# Patient Record
Sex: MALE | Race: WHITE | NOT HISPANIC OR LATINO | Employment: OTHER | URBAN - METROPOLITAN AREA
[De-identification: names, ages, dates, MRNs, and addresses within clinical notes are randomized per-mention and may not be internally consistent; named-entity substitution may affect disease eponyms.]

---

## 2024-02-10 DIAGNOSIS — I48.92 ATRIAL FIBRILLATION AND FLUTTER: ICD-10-CM

## 2024-02-10 DIAGNOSIS — I48.91 ATRIAL FIBRILLATION AND FLUTTER: ICD-10-CM

## 2024-02-12 RX ORDER — APIXABAN 5 MG/1
5 TABLET, FILM COATED ORAL 2 TIMES DAILY
Qty: 60 TABLET | Refills: 0 | Status: SHIPPED | OUTPATIENT
Start: 2024-02-12

## 2024-02-19 ENCOUNTER — OFFICE VISIT (OUTPATIENT)
Dept: CARDIOLOGY | Facility: CLINIC | Age: 64
End: 2024-02-19
Payer: COMMERCIAL

## 2024-02-19 VITALS
BODY MASS INDEX: 33.96 KG/M2 | DIASTOLIC BLOOD PRESSURE: 84 MMHG | WEIGHT: 264.6 LBS | HEART RATE: 92 BPM | SYSTOLIC BLOOD PRESSURE: 142 MMHG | HEIGHT: 74 IN | OXYGEN SATURATION: 97 %

## 2024-02-19 DIAGNOSIS — G47.33 OSA (OBSTRUCTIVE SLEEP APNEA): ICD-10-CM

## 2024-02-19 DIAGNOSIS — Z79.01 CHRONIC ANTICOAGULATION: ICD-10-CM

## 2024-02-19 DIAGNOSIS — I10 PRIMARY HYPERTENSION: ICD-10-CM

## 2024-02-19 DIAGNOSIS — I48.91 ATRIAL FIBRILLATION AND FLUTTER: Primary | ICD-10-CM

## 2024-02-19 DIAGNOSIS — I48.92 ATRIAL FIBRILLATION AND FLUTTER: Primary | ICD-10-CM

## 2024-02-19 PROCEDURE — 99213 OFFICE O/P EST LOW 20 MIN: CPT | Performed by: PHYSICIAN ASSISTANT

## 2024-02-19 RX ORDER — HYDROCHLOROTHIAZIDE 12.5 MG/1
12.5 CAPSULE, GELATIN COATED ORAL DAILY
Qty: 30 CAPSULE | Refills: 11 | Status: SHIPPED | OUTPATIENT
Start: 2024-02-19

## 2024-02-19 NOTE — PROGRESS NOTES
Encounter Date:02/19/2024      Patient ID: Dave Benitez is a 63 y.o. male.    Bethanie Trujillo MD    Cheif Complaint EP: Atrial Fibrillation and Atrial Flutter    PROBLEM LIST:  Patient Active Problem List    Diagnosis Date Noted    Atrial fibrillation and flutter 10/14/2022     Priority: High     Note Last Updated: 2/6/2023     PVI, 11/17/2022: Normal AV node and His-Purkinje system function. Durable isolation of left-sided pulmonary veins posterior wall as well as right inferior pulmonary from index ablation procedures in [place].  Reisolation of right superior pulmonary vein reisolation of left atrial roof reisolation of left atrial posterior wall. Catheter ablation of SVT #1 from proximal coronary sinus. Catheter ablation of SVT #2 from proximal coronary sinus      Chronic anticoagulation 02/06/2023     Priority: Low    Primary hypertension 10/13/2022     Priority: Low    Coe's esophagus 10/14/2022    ANGELITO (obstructive sleep apnea) 10/14/2022    GERD (gastroesophageal reflux disease) 10/13/2022               History of Present Illness  Patient presents today for follow-up with a history of atrial fibrillation and flutter, PVA, hypertension, ANGELITO.  Patient was recently in [facility] ER due to a systolic BP of 200 where his ACE was switched to an ARB due to coughing side effects.  The patient presents today with a blood pressure of 142/84 which he states is similar to baseline.  He denies palpitations, shortness of breath, lightheadedness.  Patient is compliant with CPAP, anticoagulation, and current medical regimen.       Allergies   Allergen Reactions    Lisinopril Cough       Current Outpatient Medications   Medication Instructions    Eliquis 5 mg, Oral, 2 Times Daily    ibuprofen (ADVIL,MOTRIN) 400 mg, Oral, Every 6 Hours PRN    losartan (COZAAR) 100 mg, Oral, Daily    omeprazole (PRILOSEC) 20 mg, Oral, Daily       .    Objective:     /84 (BP Location: Left arm, Patient Position:  "Sitting)   Pulse 92   Ht 188 cm (74\")   Wt 120 kg (264 lb 9.6 oz)   SpO2 97%   BMI 33.97 kg/m²    Body mass index is 33.97 kg/m².     Constitutional:       Appearance: Well-developed.   Pulmonary:      Effort: Pulmonary effort is normal. No respiratory distress.      Breath sounds: Normal breath sounds. No wheezing. No rales.      Comments: Bases clear  Chest:      Chest wall: Not tender to palpatation.   Cardiovascular:      Normal rate. Regular rhythm.      Murmurs: There is no murmur.      No gallop.  No click. No rub.   Pulses:     Intact distal pulses.   Edema:     Peripheral edema absent.   Musculoskeletal: Normal range of motion.       Lab Review:     Lab Results   Component Value Date    GLUCOSE 118 (H) 02/09/2024    BUN 12 02/09/2024    CREATININE 0.86 02/09/2024    EGFR 97.9 02/09/2024    BCR 14.0 02/09/2024    K 4.1 02/09/2024    CO2 23.0 02/09/2024    CALCIUM 9.2 02/09/2024    ALBUMIN 4.1 02/09/2024    BILITOT 0.3 02/09/2024    AST 21 02/09/2024    ALT 31 02/09/2024     Lab Results   Component Value Date    WBC 6.76 02/09/2024    HGB 12.4 (L) 02/09/2024    HCT 36.9 (L) 02/09/2024    MCV 95.3 02/09/2024     02/09/2024     No results found for: \"TSH\"        Procedures               Assessment:      Diagnosis Plan   1. Atrial fibrillation and flutter  Sinus rhythm.  Recurrent low A-fib/flutter.      2. Primary hypertension  To goal.  Add hydrochlorothiazide 12.5 mg daily.  Titrate to effect.  Continue follow-up with primary cardiology      3. ANGELITO (obstructive sleep apnea)  CPAP compliant      4. Chronic anticoagulation    On Eliquis.  No bleeding        Plan:     Stable cardiac status.  Continue current medications.   in 1 year, sooner as needed.  Thank you for allowing us to participate in the care of your patient.     Electronically signed by YFN Norton, 02/19/24, 11:30 AM SERGIO.      YFN Velázquez-S2  "

## 2024-03-09 DIAGNOSIS — I48.91 ATRIAL FIBRILLATION AND FLUTTER: ICD-10-CM

## 2024-03-09 DIAGNOSIS — I48.92 ATRIAL FIBRILLATION AND FLUTTER: ICD-10-CM

## 2024-03-11 RX ORDER — APIXABAN 5 MG/1
5 TABLET, FILM COATED ORAL 2 TIMES DAILY
Qty: 60 TABLET | Refills: 0 | Status: SHIPPED | OUTPATIENT
Start: 2024-03-11

## 2024-03-25 ENCOUNTER — TELEPHONE (OUTPATIENT)
Dept: CARDIOLOGY | Facility: CLINIC | Age: 64
End: 2024-03-25

## 2024-03-25 NOTE — TELEPHONE ENCOUNTER
Caller: Dave Benitez    Relationship: Self    Best call back number: [phone]     Which medication are you concerned about: LOSARTAN     Who prescribed you this medication: SHANNON WHYTE     When did you start taking this medication: AROUND 02/09/24    What are your concerns: PATIENT STATED THAT AT NIGHT TIME WHEN HE'S SITTING IN HIS CHAIR FOR 1-2 HOURS HE GETS CHILLS AND HIS BLOOD PRESSURE STARTS TO RISE. PATIENT STATED THAT HE DID NOT NOTICE THIS UNTIL HE STARTED TAKING THIS MEDICATION. PATIENT SAID THE OTHER NIGHT WHEN SITTING DOWN HE STARTED HAVING THESE SYMPTOMS AND HE WATCHED HIS BLOOD PRESSURE GO FROM 130/80 /95 AND HE STARTED HAVING CHILLS. PATIENT STATED AFTER GETTING UNDER A BLANKET AND CRACKING UP THE HEATER HIS CHILLS WENT AWAY AND HIS BLOOD PRESSURE CAME DOWN. PATIENT IS CONCERNED THAT HIS MEDICATION DOSE MAY NEED TO BE ADJUSTED.     How long have you had these concerns: 2 WEEKS AFTER HE STARTED THIS MEDICATION.       PATIENT IS NOT CURRENTLY HAVING THESE SYMPTOMS AS THEY COME AND GO

## 2024-03-26 ENCOUNTER — LAB (OUTPATIENT)
Dept: LAB | Facility: HOSPITAL | Age: 64
End: 2024-03-26
Payer: COMMERCIAL

## 2024-03-26 ENCOUNTER — OFFICE VISIT (OUTPATIENT)
Dept: FAMILY MEDICINE CLINIC | Facility: CLINIC | Age: 64
End: 2024-03-26
Payer: COMMERCIAL

## 2024-03-26 VITALS
SYSTOLIC BLOOD PRESSURE: 126 MMHG | OXYGEN SATURATION: 98 % | BODY MASS INDEX: 33.88 KG/M2 | WEIGHT: 264 LBS | HEIGHT: 74 IN | DIASTOLIC BLOOD PRESSURE: 84 MMHG | HEART RATE: 66 BPM

## 2024-03-26 DIAGNOSIS — R73.9 HYPERGLYCEMIA: ICD-10-CM

## 2024-03-26 DIAGNOSIS — Z79.01 CHRONIC ANTICOAGULATION: ICD-10-CM

## 2024-03-26 DIAGNOSIS — Z13.220 SCREENING CHOLESTEROL LEVEL: ICD-10-CM

## 2024-03-26 DIAGNOSIS — R68.83 CHILLS: ICD-10-CM

## 2024-03-26 DIAGNOSIS — R35.0 BENIGN PROSTATIC HYPERPLASIA WITH URINARY FREQUENCY: ICD-10-CM

## 2024-03-26 DIAGNOSIS — I48.92 ATRIAL FIBRILLATION AND FLUTTER: ICD-10-CM

## 2024-03-26 DIAGNOSIS — K22.719 BARRETT'S ESOPHAGUS WITH DYSPLASIA: ICD-10-CM

## 2024-03-26 DIAGNOSIS — R23.8 PAPULES: ICD-10-CM

## 2024-03-26 DIAGNOSIS — I48.91 ATRIAL FIBRILLATION AND FLUTTER: ICD-10-CM

## 2024-03-26 DIAGNOSIS — R68.83 CHILLS: Primary | ICD-10-CM

## 2024-03-26 DIAGNOSIS — K21.00 GASTROESOPHAGEAL REFLUX DISEASE WITH ESOPHAGITIS, UNSPECIFIED WHETHER HEMORRHAGE: ICD-10-CM

## 2024-03-26 DIAGNOSIS — I10 PRIMARY HYPERTENSION: ICD-10-CM

## 2024-03-26 DIAGNOSIS — N40.1 BENIGN PROSTATIC HYPERPLASIA WITH URINARY FREQUENCY: ICD-10-CM

## 2024-03-26 PROBLEM — Z85.038 HISTORY OF COLON CANCER: Status: ACTIVE | Noted: 2024-03-26

## 2024-03-26 LAB
BILIRUB UR QL STRIP: NEGATIVE
CLARITY UR: CLEAR
COLOR UR: YELLOW
GLUCOSE UR STRIP-MCNC: NEGATIVE MG/DL
HBA1C MFR BLD: 6 % (ref 4.8–5.6)
HGB UR QL STRIP.AUTO: NEGATIVE
HOLD SPECIMEN: NORMAL
KETONES UR QL STRIP: NEGATIVE
LEUKOCYTE ESTERASE UR QL STRIP.AUTO: NEGATIVE
NITRITE UR QL STRIP: NEGATIVE
PH UR STRIP.AUTO: 6.5 [PH] (ref 5–8)
PROT UR QL STRIP: NEGATIVE
SP GR UR STRIP: 1.01 (ref 1–1.03)
UROBILINOGEN UR QL STRIP: NORMAL

## 2024-03-26 PROCEDURE — 84443 ASSAY THYROID STIM HORMONE: CPT

## 2024-03-26 PROCEDURE — 87086 URINE CULTURE/COLONY COUNT: CPT

## 2024-03-26 PROCEDURE — 81003 URINALYSIS AUTO W/O SCOPE: CPT

## 2024-03-26 PROCEDURE — 83036 HEMOGLOBIN GLYCOSYLATED A1C: CPT

## 2024-03-26 PROCEDURE — 80053 COMPREHEN METABOLIC PANEL: CPT

## 2024-03-26 RX ORDER — OMEPRAZOLE 20 MG/1
20 CAPSULE, DELAYED RELEASE ORAL DAILY
Qty: 90 CAPSULE | Refills: 2 | Status: SHIPPED | OUTPATIENT
Start: 2024-03-26

## 2024-03-26 RX ORDER — TADALAFIL 5 MG/1
5 TABLET ORAL DAILY
Qty: 30 TABLET | Refills: 3 | Status: SHIPPED | OUTPATIENT
Start: 2024-03-26

## 2024-03-26 NOTE — PROGRESS NOTES
Dave Benitez  1960  [id number]  Patient Care Team:  Vega Ash MD as PCP - General (Internal Medicine)  Shen Recio DO as Consulting Physician (Cardiology)    Dave Benitez is a 64 y.o. male here today to establish care.  This patient is accompanied by their self who contributes to the history of their care.    Chief Complaint:    Chief Complaint   Patient presents with    Blood Sugar Problem     Would like to follow up, has no issues of blood sugar in past but was seen at first care clinic in [facility] and they told me it was because I had breakfast that morning    Hypertension        History of Present Illness:   This gentleman is here to establish.  He is followed by cardiology and electrophysiology.  He has a history of ablative therapy for atrial fibrillation and flutter.  He is on chronic anticoagulation.  He also has primary hypertension for which she takes losartan and hydrochlorothiazide.  He is also maintained on Eliquis for his history of A-fib.  Apparently has history of Coe's esophagus and is maintained on omeprazole 20 mg daily.  Finally he is on CPAP for obstructive sleep apnea.  He maintains compliance with this.    He was told his sugar was elevated at [facility]. Denies polyuria/dipsia. 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     Was seen in er for BP elevation, was changed to cozaar from lisinopril ( cough). Since this time they have added hctz. Bps are much improved. He has developed, in the evenings, he will get bp elvation transiently associated with chills and shivering). He bundles up and feels better. Has happened 3 times)    Recently had a bladder infection 2 mon ago ( was treated took a month to clear up)     He denies cough, congestion, nausea, vomiting or diarrhea, irritative voidng symtoms. Denies rash etc.     His colonoscopies and endoscopies have to be done under general anesthesia secondary to cardiac conditions and severe gag reflex.  "    Past Medical History:   Diagnosis Date    Colon cancer     12 years ago    Colon polyp     HTN (hypertension)        Past Surgical History:   Procedure Laterality Date    ABLATION OF DYSRHYTHMIC FOCUS N/A 11/07/2014    x3    CARDIAC ELECTROPHYSIOLOGY PROCEDURE N/A 11/17/2022    Procedure: Ablation atrial fibrillation +1 hour. Hold flecainide 3 days prior.;  Surgeon: Shen Recio DO;  Location: [facility] INVASIVE LOCATION;  Service: Cardiovascular;  Laterality: N/A;    COLON RESECTION N/A     HERNIA REPAIR N/A     MENISCECTOMY          Family History   Problem Relation Age of Onset    Stroke Mother     Esophageal cancer Father        Social History     Socioeconomic History    Marital status:    Tobacco Use    Smoking status: Never     Passive exposure: Never    Smokeless tobacco: Former     Types: Chew     Quit date: 1982   Vaping Use    Vaping status: Never Used   Substance and Sexual Activity    Alcohol use: Yes     Comment: Rare    Drug use: Never    Sexual activity: Defer       Allergies   Allergen Reactions    Lisinopril Cough       Review of Systems:    Review of Systems   Constitutional:  Positive for chills.   Eyes: Negative.    Cardiovascular: Negative.    Gastrointestinal: Negative.    Endocrine: Positive for polydipsia.   Genitourinary:  Positive for frequency and erectile dysfunction.   Musculoskeletal: Negative.    Skin: Negative.    Neurological: Negative.        Vitals:    03/26/24 1123   BP: 126/84   Pulse: 66   SpO2: 98%   Weight: 120 kg (264 lb)   Height: 188 cm (74\")     Body mass index is 33.9 kg/m².      Current Outpatient Medications:     Eliquis 5 MG tablet tablet, Take 1 tablet by mouth twice daily, Disp: 60 tablet, Rfl: 0    hydroCHLOROthiazide (MICROZIDE) 12.5 MG capsule, Take 1 capsule by mouth Daily., Disp: 30 capsule, Rfl: 11    ibuprofen (ADVIL,MOTRIN) 400 MG tablet, Take 1 tablet by mouth Every 6 (Six) Hours As Needed for Mild Pain., Disp: , Rfl:     losartan (COZAAR) 100 " MG tablet, Take 1 tablet by mouth Daily., Disp: 90 tablet, Rfl: 0    omeprazole (priLOSEC) 20 MG capsule, Take 1 capsule by mouth Daily., Disp: 90 capsule, Rfl: 2    tadalafil (Cialis) 5 MG tablet, Take 1 tablet by mouth Daily., Disp: 30 tablet, Rfl: 3    Physical Exam:    Physical Exam  Vitals and nursing note reviewed.   Constitutional:       General: He is not in acute distress.     Appearance: He is well-developed. He is not diaphoretic.   HENT:      Head: Normocephalic and atraumatic.      Right Ear: External ear normal.      Left Ear: External ear normal.      Mouth/Throat:      Pharynx: No oropharyngeal exudate.   Eyes:      General: No scleral icterus.        Right eye: No discharge.      Conjunctiva/sclera: Conjunctivae normal.   Neck:      Thyroid: No thyromegaly.      Vascular: No JVD.      Trachea: No tracheal deviation.   Cardiovascular:      Rate and Rhythm: Normal rate and regular rhythm.      Heart sounds: Normal heart sounds.      Comments: PMI nondisplaced  Pulmonary:      Effort: Pulmonary effort is normal.      Breath sounds: Normal breath sounds. No wheezing or rales.   Abdominal:      General: Bowel sounds are normal.      Palpations: Abdomen is soft.      Tenderness: There is no abdominal tenderness. There is no guarding or rebound.   Musculoskeletal:      Cervical back: Normal range of motion and neck supple.   Lymphadenopathy:      Cervical: No cervical adenopathy.   Skin:     General: Skin is warm and dry.      Capillary Refill: Capillary refill takes less than 2 seconds.      Coloration: Skin is not pale.      Findings: No rash.      Comments: He has numerous pearly papules on his face.  1 to 3 mm in diameter.   Neurological:      Mental Status: He is alert and oriented to person, place, and time.      Motor: No abnormal muscle tone.      Coordination: Coordination normal.   Psychiatric:         Judgment: Judgment normal.         Procedures    Results Review:    I reviewed the patient's  new clinical results.    Assessment/Plan:    Problem List Items Addressed This Visit       GERD (gastroesophageal reflux disease)    Relevant Medications    omeprazole (priLOSEC) 20 MG capsule    Primary hypertension    Relevant Medications    losartan (COZAAR) 100 MG tablet    hydroCHLOROthiazide (MICROZIDE) 12.5 MG capsule    Coe's esophagus    Current Assessment & Plan     Will be due in about 2 years for repeat colonoscopy as well as EGD.  This will need to be done in the hospital under general anesthesia based on his history.  Continue Prilosec for the foreseeable future.         Relevant Medications    omeprazole (priLOSEC) 20 MG capsule    Atrial fibrillation and flutter    Overview     PVI, 11/17/2022: Normal AV node and His-Purkinje system function. Durable isolation of left-sided pulmonary veins posterior wall as well as right inferior pulmonary from index ablation procedures in [place].  Reisolation of right superior pulmonary vein reisolation of left atrial roof reisolation of left atrial posterior wall. Catheter ablation of SVT #1 from proximal coronary sinus. Catheter ablation of SVT #2 from proximal coronary sinus         Relevant Medications    Eliquis 5 MG tablet tablet    tadalafil (Cialis) 5 MG tablet    Chronic anticoagulation    Papules    Current Assessment & Plan     Derm evaluation recommended to eval for BCC          Other Visit Diagnoses       Chills    -  Primary    Relevant Orders    TSH Rfx On Abnormal To Free T4    Urinalysis With Culture If Indicated -    Urine Culture - Urine, Urine, Clean Catch    Hyperglycemia        Relevant Orders    Comprehensive Metabolic Panel    Hemoglobin A1c    Screening cholesterol level        Benign prostatic hyperplasia with urinary frequency        Relevant Medications    tadalafil (Cialis) 5 MG tablet        I recommended he seek care with a dermatologist for evaluation for possible basal cell carcinoma of the face.  He has not seen your  dermatologist.    Will continue his current antihypertensive medications that seem to be working well.    Given his concerns of chills and diaphoresis since starting Cozaar, on the heels of a recent urinary tract infection I would like to ensure that there is no smoldering infection causing blood pressure elevation, diaphoresis and chills.  In the absence of infection, perhaps it is a medication side effect.      If initiated Cialis 5 mg daily for BPH with LUTS.  Side effect profile discussed.    Plan of care reviewed with patient at the conclusion of today's visit. Education was provided regarding diagnosis and management.  Patient verbalizes understanding of and agreement with management plan.    Return in about 3 months (around 6/26/2024) for Annual.    Vega Ash MD      Please note than portions of this note were completed wt a Voice Recognition Program

## 2024-03-26 NOTE — ASSESSMENT & PLAN NOTE
Will be due in about 2 years for repeat colonoscopy as well as EGD.  This will need to be done in the hospital under general anesthesia based on his history.  Continue Prilosec for the foreseeable future.

## 2024-03-27 DIAGNOSIS — R73.03 PREDIABETES: Primary | ICD-10-CM

## 2024-03-27 LAB
ALBUMIN SERPL-MCNC: 4.3 G/DL (ref 3.5–5.2)
ALBUMIN/GLOB SERPL: 1.4 G/DL
ALP SERPL-CCNC: 102 U/L (ref 39–117)
ALT SERPL W P-5'-P-CCNC: 21 U/L (ref 1–41)
ANION GAP SERPL CALCULATED.3IONS-SCNC: 11.7 MMOL/L (ref 5–15)
AST SERPL-CCNC: 23 U/L (ref 1–40)
BILIRUB SERPL-MCNC: 0.5 MG/DL (ref 0–1.2)
BUN SERPL-MCNC: 15 MG/DL (ref 8–23)
BUN/CREAT SERPL: 14.2 (ref 7–25)
CALCIUM SPEC-SCNC: 9.5 MG/DL (ref 8.6–10.5)
CHLORIDE SERPL-SCNC: 101 MMOL/L (ref 98–107)
CO2 SERPL-SCNC: 24.3 MMOL/L (ref 22–29)
CREAT SERPL-MCNC: 1.06 MG/DL (ref 0.76–1.27)
EGFRCR SERPLBLD CKD-EPI 2021: 78.9 ML/MIN/1.73
GLOBULIN UR ELPH-MCNC: 3.1 GM/DL
GLUCOSE SERPL-MCNC: 91 MG/DL (ref 65–99)
POTASSIUM SERPL-SCNC: 4.1 MMOL/L (ref 3.5–5.2)
PROT SERPL-MCNC: 7.4 G/DL (ref 6–8.5)
SODIUM SERPL-SCNC: 137 MMOL/L (ref 136–145)
TSH SERPL DL<=0.05 MIU/L-ACNC: 1.26 UIU/ML (ref 0.27–4.2)

## 2024-03-27 NOTE — PROGRESS NOTES
Please notify urinalysis looks fine.  Urine culture showing no growth to date.  Thyroid fine.  Liver kidneys random glucose level normal electrolytes normal.  His hemoglobin A1c was 6.0 which is 3-month glucose average.  This puts him at a prediabetic level.  I have made a referral to diabetic education for prediabetes counseling

## 2024-03-28 ENCOUNTER — PRIOR AUTHORIZATION (OUTPATIENT)
Dept: FAMILY MEDICINE CLINIC | Facility: CLINIC | Age: 64
End: 2024-03-28
Payer: COMMERCIAL

## 2024-03-28 ENCOUNTER — PATIENT ROUNDING (BHMG ONLY) (OUTPATIENT)
Dept: FAMILY MEDICINE CLINIC | Facility: CLINIC | Age: 64
End: 2024-03-28
Payer: COMMERCIAL

## 2024-03-28 LAB — BACTERIA SPEC AEROBE CULT: NO GROWTH

## 2024-03-28 NOTE — TELEPHONE ENCOUNTER
(Key: [id number]) - [id number]  Tadalafil 5MG tablets  Status: PA Request  Created: March 27th, 2024 [id number]  Sent: March 28th, 2024    Approvedtoday  PA Case: [id number], Status: Approved, Coverage Starts on: 3/28/2024 12:00:00 AM, Coverage Ends on: 3/28/2025 12:00:00 AM.

## 2024-04-05 ENCOUNTER — TELEPHONE (OUTPATIENT)
Dept: CARDIOLOGY | Facility: CLINIC | Age: 64
End: 2024-04-05
Payer: COMMERCIAL

## 2024-04-05 ENCOUNTER — TELEPHONE (OUTPATIENT)
Dept: CARDIOLOGY | Facility: CLINIC | Age: 64
End: 2024-04-05

## 2024-04-05 RX ORDER — HYDROCHLOROTHIAZIDE 25 MG/1
25 TABLET ORAL DAILY
Qty: 90 TABLET | Refills: 0 | Status: SHIPPED | OUTPATIENT
Start: 2024-04-05

## 2024-04-05 NOTE — TELEPHONE ENCOUNTER
At request of LANE Bhandari, I contacted patient and informed him that she had reviewed his chart and that his care provider YFN Rinaldi at EP office was aware his blood pressure was elevated at that their plan was to increase hydrochlorothiazide dose to 25 mg daily. Fannie wanted me to ask patient to increase his HCTZ dose and wait approx 2 weeks to come in and see if this helped his blood pressure. I spoke to patient about this and offered to reschedule appt. Patient explained that he was aware of plan to increase HCTZ and that he had actually taken 2 of his 12.5 mg pills this morning. Patient stated that last night he had eaten a large Chinese meal and his blood pressure was elevated after that. I explained to patient that this might have caused his blood pressure to be elevated and he agreed. Patient asked that we send in a refil of his medication since he was going to be taking more of it. I told him I would send request to provider. I moved patient's appt to 4/17/24 at 1:30. Patient verbalized understanding. Advised patient if at anytime he feels he needs to be seen sooner to contact us or seek care at emergency room. He verbalized understanding.

## 2024-04-05 NOTE — TELEPHONE ENCOUNTER
Caller: Dave Benitez    Relationship to patient: Self    Best call back number: [phone]    Chief complaint:     Type of visit: FOLLOW UP    Requested date: ASAP     If rescheduling, when is the original appointment: 5.15.2024     Additional notes:PATIENT WOULD LIKE TO BE SEEN ASAP DUE TO BLOOD PRESSURE

## 2024-04-10 DIAGNOSIS — I48.91 ATRIAL FIBRILLATION AND FLUTTER: ICD-10-CM

## 2024-04-10 DIAGNOSIS — I48.92 ATRIAL FIBRILLATION AND FLUTTER: ICD-10-CM

## 2024-04-10 RX ORDER — APIXABAN 5 MG/1
5 TABLET, FILM COATED ORAL 2 TIMES DAILY
Qty: 60 TABLET | Refills: 0 | Status: SHIPPED | OUTPATIENT
Start: 2024-04-10

## 2024-04-17 ENCOUNTER — OFFICE VISIT (OUTPATIENT)
Dept: CARDIOLOGY | Facility: CLINIC | Age: 64
End: 2024-04-17
Payer: COMMERCIAL

## 2024-04-17 VITALS
HEART RATE: 89 BPM | WEIGHT: 264 LBS | DIASTOLIC BLOOD PRESSURE: 88 MMHG | SYSTOLIC BLOOD PRESSURE: 138 MMHG | BODY MASS INDEX: 33.88 KG/M2 | OXYGEN SATURATION: 93 % | HEIGHT: 74 IN

## 2024-04-17 DIAGNOSIS — G47.33 OSA (OBSTRUCTIVE SLEEP APNEA): ICD-10-CM

## 2024-04-17 DIAGNOSIS — I10 PRIMARY HYPERTENSION: ICD-10-CM

## 2024-04-17 PROCEDURE — 99213 OFFICE O/P EST LOW 20 MIN: CPT | Performed by: NURSE PRACTITIONER

## 2024-04-17 NOTE — PROGRESS NOTES
[facility] Provider Note     Date:   2024   Name: Dave Benitez  :   1960  PCP: Vega Ash MD    Chief Complaint   Patient presents with    Follow-up     Blood Pressure        Subjective     History of Present Illness  Dave Benitez is a 64 y.o. male who presents today for a quick follow-up on blood pressure after making blood pressure medication changes.  He stop by the  a week or so ago and was complaining of hypertension.  He had recently seen [place] cardiology and they started him on HCTZ 12.5 mg daily in addition to his losartan 100 mg daily and told him that they would have to titrate his HCTZ up based on his blood pressure readings.  1 stop by the  complaining of continued hypertension we advised him to go ahead and increase his HCTZ to 25 mg daily and to schedule a 1 to 2-week follow-up to check on blood pressure.    Today he reports his blood pressure has been doing excellent since the increase in HCTZ.  He has no complaints today.  Blood pressure is at goal.  He has a follow-up scheduled in 1 month with Dr. Trujillo that he would like to keep.    Will see him back in 1 month, sooner if needed.    1. PAF - Redo EP study 2022 with Redo PVI, RFA of  left atrial roof, left atrial posterior wall, 2 separate SVTs atrial tachycardias near coronary sinus. s.p PVI re do with Dr. Recio  2. ANGELITO on pap  3. HTN    Allergies   Allergen Reactions    Lisinopril Cough       Current Outpatient Medications:     Eliquis 5 MG tablet tablet, Take 1 tablet by mouth twice daily, Disp: 60 tablet, Rfl: 0    hydroCHLOROthiazide 25 MG tablet, Take 1 tablet by mouth Daily., Disp: 90 tablet, Rfl: 0    ibuprofen (ADVIL,MOTRIN) 400 MG tablet, Take 1 tablet by mouth Every 6 (Six) Hours As Needed for Mild Pain., Disp: , Rfl:     losartan (COZAAR) 100 MG tablet, Take 1 tablet by mouth Daily., Disp: 90 tablet, Rfl: 0    omeprazole (priLOSEC) 20 MG  "capsule, Take 1 capsule by mouth Daily., Disp: 90 capsule, Rfl: 2    tadalafil (Cialis) 5 MG tablet, Take 1 tablet by mouth Daily., Disp: 30 tablet, Rfl: 3    Past Medical History:   Diagnosis Date    Colon cancer     12 years ago    Colon polyp     Sleep apnea       Past Surgical History:   Procedure Laterality Date    ABLATION OF DYSRHYTHMIC FOCUS N/A 11/07/2014    x3    CARDIAC ELECTROPHYSIOLOGY PROCEDURE N/A 11/17/2022    Procedure: Ablation atrial fibrillation +1 hour. Hold flecainide 3 days prior.;  Surgeon: Shen Recio DO;  Location: [facility] INVASIVE LOCATION;  Service: Cardiovascular;  Laterality: N/A;    COLON RESECTION N/A     HERNIA REPAIR N/A     MENISCECTOMY       Family History   Problem Relation Age of Onset    Stroke Mother     Esophageal cancer Father      Social History     Socioeconomic History    Marital status:    Tobacco Use    Smoking status: Never     Passive exposure: Never    Smokeless tobacco: Former     Types: Chew     Quit date: 1982   Vaping Use    Vaping status: Never Used   Substance and Sexual Activity    Alcohol use: Yes     Comment: Rare    Drug use: Never    Sexual activity: Defer       Objective     Vital Signs:  /88   Pulse 89   Ht 188 cm (74\")   Wt 120 kg (264 lb)   SpO2 93%   BMI 33.90 kg/m²   Estimated body mass index is 33.9 kg/m² as calculated from the following:    Height as of this encounter: 188 cm (74\").    Weight as of this encounter: 120 kg (264 lb).         Physical Exam  Vitals reviewed.   Constitutional:       Appearance: Normal appearance. He is well-developed.   HENT:      Head: Normocephalic and atraumatic.   Eyes:      General: No scleral icterus.     Pupils: Pupils are equal, round, and reactive to light.   Neck:      Vascular: No carotid bruit.   Cardiovascular:      Rate and Rhythm: Normal rate and regular rhythm.      Pulses: Normal pulses.           Radial pulses are 2+ on the right side and 2+ on the left side.        Dorsalis " pedis pulses are 2+ on the right side and 2+ on the left side.        Posterior tibial pulses are 2+ on the right side and 2+ on the left side.      Heart sounds: Normal heart sounds. No murmur heard.  Pulmonary:      Breath sounds: Normal breath sounds. No wheezing or rhonchi.   Musculoskeletal:      Right lower leg: No edema.      Left lower leg: No edema.   Skin:     General: Skin is warm and dry.      Capillary Refill: Capillary refill takes less than 2 seconds.      Coloration: Skin is not cyanotic.      Nails: There is no clubbing.   Neurological:      Mental Status: He is alert and oriented to person, place, and time.      Motor: No weakness.      Gait: Gait normal.   Psychiatric:         Mood and Affect: Mood normal.         Behavior: Behavior is cooperative.         Thought Content: Thought content normal.         Cognition and Memory: Memory normal.                     Assessment and Plan     Diagnoses and all orders for this visit:    1. Primary hypertension  Comments:  Much better.  Continue losartan 100 mg daily and hydrochlorothiazide 25 mg daily.    2. ANGELITO (obstructive sleep apnea)  Comments:  Treating ANGELITO would likely benefit blood pressure.        Recommendations: ER if symptoms increase, Report if any new/changing symptoms immediately, and Limit salt      Follow Up  Return for As scheduled.    Rachael Wheeler, APRN   04/17/2024     Please note that this explicitly excludes time spent on other separate billable services such as performing procedures or test interpretation, when applicable.    This note was created using dictation software which occasionally transcribes nonsensical phrases. Please contact the provider if any clarification is needed.

## 2024-05-02 ENCOUNTER — DOCUMENTATION (OUTPATIENT)
Dept: DIABETES SERVICES | Facility: HOSPITAL | Age: 64
End: 2024-05-02
Payer: COMMERCIAL

## 2024-05-02 ENCOUNTER — HOSPITAL ENCOUNTER (OUTPATIENT)
Dept: DIABETES SERVICES | Facility: HOSPITAL | Age: 64
Setting detail: RECURRING SERIES
Discharge: HOME | End: 2024-05-02
Payer: COMMERCIAL

## 2024-05-02 NOTE — PLAN OF CARE
Patient, along with wife and grandchildren, completed a one hour class on Prediabetes. Discussed healthy lifestyle choices and healthy nutrition to manage the recent diagnosis. Patient met with both the RN and RD today. He will be scheduled for a follow up and supported as needed. Thank you for this opportunity.

## 2024-05-09 ENCOUNTER — OFFICE VISIT (OUTPATIENT)
Dept: FAMILY MEDICINE CLINIC | Facility: CLINIC | Age: 64
End: 2024-05-09
Payer: COMMERCIAL

## 2024-05-09 ENCOUNTER — LAB (OUTPATIENT)
Dept: LAB | Facility: HOSPITAL | Age: 64
End: 2024-05-09
Payer: COMMERCIAL

## 2024-05-09 VITALS
HEIGHT: 74 IN | DIASTOLIC BLOOD PRESSURE: 84 MMHG | WEIGHT: 262.3 LBS | HEART RATE: 78 BPM | SYSTOLIC BLOOD PRESSURE: 132 MMHG | OXYGEN SATURATION: 94 % | BODY MASS INDEX: 33.66 KG/M2

## 2024-05-09 DIAGNOSIS — E78.5 DYSLIPIDEMIA: ICD-10-CM

## 2024-05-09 DIAGNOSIS — Z12.11 SCREEN FOR COLON CANCER: Primary | ICD-10-CM

## 2024-05-09 DIAGNOSIS — Z00.00 ANNUAL PHYSICAL EXAM: ICD-10-CM

## 2024-05-09 DIAGNOSIS — Z11.59 ENCOUNTER FOR HEPATITIS C SCREENING TEST FOR LOW RISK PATIENT: ICD-10-CM

## 2024-05-09 DIAGNOSIS — Z79.01 CHRONIC ANTICOAGULATION: ICD-10-CM

## 2024-05-09 DIAGNOSIS — K22.719 BARRETT'S ESOPHAGUS WITH DYSPLASIA: ICD-10-CM

## 2024-05-09 DIAGNOSIS — I10 PRIMARY HYPERTENSION: ICD-10-CM

## 2024-05-09 DIAGNOSIS — G47.33 OSA (OBSTRUCTIVE SLEEP APNEA): ICD-10-CM

## 2024-05-09 DIAGNOSIS — K21.9 GASTROESOPHAGEAL REFLUX DISEASE, UNSPECIFIED WHETHER ESOPHAGITIS PRESENT: ICD-10-CM

## 2024-05-09 DIAGNOSIS — Z12.5 PROSTATE CANCER SCREENING: ICD-10-CM

## 2024-05-09 DIAGNOSIS — R73.03 PREDIABETES: ICD-10-CM

## 2024-05-09 LAB
ALBUMIN SERPL-MCNC: 4.6 G/DL (ref 3.5–5.2)
ALBUMIN/GLOB SERPL: 1.5 G/DL
ALP SERPL-CCNC: 92 U/L (ref 39–117)
ALT SERPL W P-5'-P-CCNC: 21 U/L (ref 1–41)
ANION GAP SERPL CALCULATED.3IONS-SCNC: 13 MMOL/L (ref 5–15)
AST SERPL-CCNC: 16 U/L (ref 1–40)
BILIRUB SERPL-MCNC: 0.7 MG/DL (ref 0–1.2)
BUN SERPL-MCNC: 14 MG/DL (ref 8–23)
BUN/CREAT SERPL: 13.2 (ref 7–25)
CALCIUM SPEC-SCNC: 10 MG/DL (ref 8.6–10.5)
CHLORIDE SERPL-SCNC: 102 MMOL/L (ref 98–107)
CHOLEST SERPL-MCNC: 165 MG/DL (ref 0–200)
CO2 SERPL-SCNC: 27 MMOL/L (ref 22–29)
CREAT SERPL-MCNC: 1.06 MG/DL (ref 0.76–1.27)
DEPRECATED RDW RBC AUTO: 41.8 FL (ref 37–54)
EGFRCR SERPLBLD CKD-EPI 2021: 78.9 ML/MIN/1.73
ERYTHROCYTE [DISTWIDTH] IN BLOOD BY AUTOMATED COUNT: 12.6 % (ref 12.3–15.4)
GLOBULIN UR ELPH-MCNC: 3 GM/DL
GLUCOSE SERPL-MCNC: 109 MG/DL (ref 65–99)
HCT VFR BLD AUTO: 41.7 % (ref 37.5–51)
HCV AB SER QL: NORMAL
HDLC SERPL-MCNC: 49 MG/DL (ref 40–60)
HGB BLD-MCNC: 14.5 G/DL (ref 13–17.7)
LDLC SERPL CALC-MCNC: 101 MG/DL (ref 0–100)
LDLC/HDLC SERPL: 2.05 {RATIO}
MCH RBC QN AUTO: 31.9 PG (ref 26.6–33)
MCHC RBC AUTO-ENTMCNC: 34.8 G/DL (ref 31.5–35.7)
MCV RBC AUTO: 91.9 FL (ref 79–97)
PLATELET # BLD AUTO: 241 10*3/MM3 (ref 140–450)
PMV BLD AUTO: 9.9 FL (ref 6–12)
POTASSIUM SERPL-SCNC: 4.3 MMOL/L (ref 3.5–5.2)
PROT SERPL-MCNC: 7.6 G/DL (ref 6–8.5)
PSA SERPL-MCNC: 0.54 NG/ML (ref 0–4)
RBC # BLD AUTO: 4.54 10*6/MM3 (ref 4.14–5.8)
SODIUM SERPL-SCNC: 142 MMOL/L (ref 136–145)
TRIGL SERPL-MCNC: 78 MG/DL (ref 0–150)
TSH SERPL DL<=0.05 MIU/L-ACNC: 1.17 UIU/ML (ref 0.27–4.2)
VLDLC SERPL-MCNC: 15 MG/DL (ref 5–40)
WBC NRBC COR # BLD AUTO: 5.69 10*3/MM3 (ref 3.4–10.8)

## 2024-05-09 PROCEDURE — 80050 GENERAL HEALTH PANEL: CPT

## 2024-05-09 PROCEDURE — 86803 HEPATITIS C AB TEST: CPT

## 2024-05-09 PROCEDURE — G0103 PSA SCREENING: HCPCS

## 2024-05-09 PROCEDURE — 80061 LIPID PANEL: CPT

## 2024-05-09 PROCEDURE — 90471 IMMUNIZATION ADMIN: CPT | Performed by: INTERNAL MEDICINE

## 2024-05-09 PROCEDURE — 90714 TD VACC NO PRESV 7 YRS+ IM: CPT | Performed by: INTERNAL MEDICINE

## 2024-05-09 PROCEDURE — 99396 PREV VISIT EST AGE 40-64: CPT | Performed by: INTERNAL MEDICINE

## 2024-05-09 PROCEDURE — 36415 COLL VENOUS BLD VENIPUNCTURE: CPT

## 2024-05-10 NOTE — PROGRESS NOTES
Labs appear acceptable.  Minimal elevation of his bad cholesterol at 101.  Fasting glucose improved however still slightly elevated continue efforts at diet and exercise.  Rest of labs adequate.

## 2024-05-10 NOTE — PROGRESS NOTES
Blood work shows cholesterol increased range.  His fasting glucose is improved today still mildly elevated.  Continue efforts at diet exercise.  Liver and kidneys all adequate thyroid PSA okay.

## 2024-05-11 DIAGNOSIS — I48.91 ATRIAL FIBRILLATION AND FLUTTER: ICD-10-CM

## 2024-05-11 DIAGNOSIS — I48.92 ATRIAL FIBRILLATION AND FLUTTER: ICD-10-CM

## 2024-05-13 ENCOUNTER — TELEPHONE (OUTPATIENT)
Dept: CARDIOLOGY | Facility: CLINIC | Age: 64
End: 2024-05-13
Payer: COMMERCIAL

## 2024-05-13 RX ORDER — APIXABAN 5 MG/1
5 TABLET, FILM COATED ORAL 2 TIMES DAILY
Qty: 60 TABLET | Refills: 3 | Status: SHIPPED | OUTPATIENT
Start: 2024-05-13 | End: 2024-05-15 | Stop reason: SDUPTHER

## 2024-05-13 RX ORDER — LOSARTAN POTASSIUM 100 MG/1
100 TABLET ORAL DAILY
Qty: 90 TABLET | Refills: 0 | Status: SHIPPED | OUTPATIENT
Start: 2024-05-13

## 2024-05-13 NOTE — TELEPHONE ENCOUNTER
Caller: Emmanuel Dave Matias    Relationship: Self  Best call back number: [phone]    Requested Prescriptions:   Requested Prescriptions     Pending Prescriptions Disp Refills    losartan (COZAAR) 100 MG tablet 90 tablet 0     Sig: Take 1 tablet by mouth Daily.        Pharmacy where request should be sent: [facility] [address] [place] [address] [phone] [facility] [phone] FX     Last office visit with prescribing clinician: Visit date not found   Last telemedicine visit with prescribing clinician: Visit date not found   Next office visit with prescribing clinician: Visit date not found     Additional details provided by patient:     Does the patient have less than a 3 day supply:  [x] Yes  [] No    Would you like a call back once the refill request has been completed: [] Yes [x] No    If the office needs to give you a call back, can they leave a voicemail: [] Yes [x] No    Homero Page Rep   05/13/24 10:26 EDT

## 2024-05-13 NOTE — TELEPHONE ENCOUNTER
Patient called and left a message. I tried to call him back but he did not answer. I left a message for him to call us back at the office.

## 2024-05-15 ENCOUNTER — OFFICE VISIT (OUTPATIENT)
Dept: CARDIOLOGY | Facility: CLINIC | Age: 64
End: 2024-05-15
Payer: COMMERCIAL

## 2024-05-15 VITALS
DIASTOLIC BLOOD PRESSURE: 76 MMHG | HEIGHT: 74 IN | BODY MASS INDEX: 33.62 KG/M2 | SYSTOLIC BLOOD PRESSURE: 134 MMHG | HEART RATE: 83 BPM | OXYGEN SATURATION: 97 % | WEIGHT: 262 LBS

## 2024-05-15 DIAGNOSIS — G47.33 OSA (OBSTRUCTIVE SLEEP APNEA): ICD-10-CM

## 2024-05-15 DIAGNOSIS — I10 PRIMARY HYPERTENSION: ICD-10-CM

## 2024-05-15 DIAGNOSIS — I48.92 ATRIAL FIBRILLATION AND FLUTTER: ICD-10-CM

## 2024-05-15 DIAGNOSIS — I48.0 PAROXYSMAL ATRIAL FIBRILLATION: Primary | ICD-10-CM

## 2024-05-15 DIAGNOSIS — I48.91 ATRIAL FIBRILLATION AND FLUTTER: ICD-10-CM

## 2024-05-15 PROCEDURE — 99214 OFFICE O/P EST MOD 30 MIN: CPT | Performed by: INTERNAL MEDICINE

## 2024-05-15 RX ORDER — LORATADINE 10 MG/1
10 CAPSULE, LIQUID FILLED ORAL DAILY
COMMUNITY

## 2024-05-20 NOTE — PROGRESS NOTES
[facility] Provider Note     Date:   05/15/2024   Name: Dave Benitez  :   1960  PCP: Vega Ash MD    Chief Complaint   Patient presents with    Hypertension     Pt here to follow up on HTN.       Subjective     History of Present Illness  Dave Benitez is a 64 y.o. male who presents today for follow-up on atrial fibrillation sleep apnea and hypertension.  He is overall feeling well.  He denies any new A-fib episodes.  He feels like he is doing very well from that standpoint.  He denies any chest pain or shortness of breath.  Wearing his PAP device well and getting good benefit out of it.    1. PAF - Redo EP study 2022 with Redo PVI, RFA of  left atrial roof, left atrial posterior wall, 2 separate SVTs atrial tachycardias near coronary sinus. s.p PVI re do with Dr. Recio  2. ANGELITO on pap  3. HTN    Allergies   Allergen Reactions    Lisinopril Cough       Current Outpatient Medications:     hydroCHLOROthiazide 25 MG tablet, Take 1 tablet by mouth Daily., Disp: 90 tablet, Rfl: 0    ibuprofen (ADVIL,MOTRIN) 400 MG tablet, Take 1 tablet by mouth Every 6 (Six) Hours As Needed for Mild Pain., Disp: , Rfl:     Loratadine 10 MG capsule, Take 1 capsule by mouth Daily., Disp: , Rfl:     losartan (COZAAR) 100 MG tablet, Take 1 tablet by mouth Daily., Disp: 90 tablet, Rfl: 0    omeprazole (priLOSEC) 20 MG capsule, Take 1 capsule by mouth Daily., Disp: 90 capsule, Rfl: 2    tadalafil (Cialis) 5 MG tablet, Take 1 tablet by mouth Daily., Disp: 30 tablet, Rfl: 3    apixaban (Eliquis) 5 MG tablet tablet, Take 1 tablet by mouth 2 (Two) Times a Day., Disp: 60 tablet, Rfl: 3    Past Medical History:   Diagnosis Date    Colon cancer     12 years ago    Colon polyp     Hypertension     Sleep apnea     baseline AHI 18      Past Surgical History:   Procedure Laterality Date    ABLATION OF DYSRHYTHMIC FOCUS N/A 11/07/2014    x3    CARDIAC ELECTROPHYSIOLOGY PROCEDURE N/A  "11/17/2022    Procedure: Ablation atrial fibrillation +1 hour. Hold flecainide 3 days prior.;  Surgeon: Shen Recio DO;  Location: [facility] INVASIVE LOCATION;  Service: Cardiovascular;  Laterality: N/A;    COLON RESECTION N/A     HERNIA REPAIR N/A     MENISCECTOMY       Family History   Problem Relation Age of Onset    Stroke Mother     Esophageal cancer Father      Social History     Socioeconomic History    Marital status:    Tobacco Use    Smoking status: Never     Passive exposure: Never    Smokeless tobacco: Former     Types: Chew     Quit date: 1982   Vaping Use    Vaping status: Never Used   Substance and Sexual Activity    Alcohol use: Yes     Comment: Rare    Drug use: Never    Sexual activity: Defer       Objective     Vital Signs:  /76 (BP Location: Left arm, Patient Position: Sitting, Cuff Size: Adult)   Pulse 83   Ht 188 cm (74\")   Wt 119 kg (262 lb)   SpO2 97%   BMI 33.64 kg/m²   Estimated body mass index is 33.64 kg/m² as calculated from the following:    Height as of this encounter: 188 cm (74\").    Weight as of this encounter: 119 kg (262 lb).         Physical Exam  Vitals reviewed.   Constitutional:       General: He is not in acute distress.     Appearance: Normal appearance.   HENT:      Head: Normocephalic and atraumatic.      Mouth/Throat:      Mouth: Mucous membranes are moist.   Eyes:      Conjunctiva/sclera: Conjunctivae normal.   Neck:      Vascular: No carotid bruit.   Cardiovascular:      Rate and Rhythm: Normal rate and regular rhythm.      Pulses: Normal pulses.      Heart sounds: Normal heart sounds. No murmur heard.  Pulmonary:      Effort: Pulmonary effort is normal. No respiratory distress.      Breath sounds: Normal breath sounds. No wheezing or rhonchi.   Abdominal:      General: Abdomen is flat.      Palpations: Abdomen is soft.   Musculoskeletal:      Cervical back: Normal range of motion and neck supple.      Right lower leg: No edema.      Left lower " leg: No edema.   Skin:     General: Skin is warm and dry.      Coloration: Skin is not jaundiced.   Neurological:      General: No focal deficit present.      Mental Status: He is alert and oriented to person, place, and time. Mental status is at baseline.      GCS: GCS eye subscore is 4. GCS verbal subscore is 5. GCS motor subscore is 6.      Cranial Nerves: No cranial nerve deficit.      Motor: No weakness.      Gait: Gait normal.   Psychiatric:         Mood and Affect: Mood and affect normal. Mood is not anxious.         Speech: Speech normal.         Behavior: Behavior normal.                     Assessment and Plan     Diagnoses and all orders for this visit:    1. Paroxysmal atrial fibrillation (Primary)  Comments:  Has a 1 year follow-up with Dr Recio.  Can likely continue follow-up here afterwards.    2. ANGELITO (obstructive sleep apnea)  Comments:  Good compliance and control.    3. Primary hypertension  Comments:  Recent blood pressure issues seem to resolve.  Continue current medications.        Recommendations: Report if any new/changing symptoms immediately      Follow Up  No follow-ups on file.  6 months to a year.  Bethanie Trujillo MD   05/15/2024     Please note that this explicitly excludes time spent on other separate billable services such as performing procedures or test interpretation, when applicable.    This note was created using dictation software which occasionally transcribes nonsensical phrases. Please contact the provider if any clarification is needed.

## 2024-05-21 ENCOUNTER — TELEPHONE (OUTPATIENT)
Age: 64
End: 2024-05-21
Payer: COMMERCIAL

## 2024-05-21 RX ORDER — ASPIRIN 81 MG/1
81 TABLET ORAL DAILY
Start: 2024-05-21

## 2024-05-21 NOTE — TELEPHONE ENCOUNTER
please let him know that I have spoken with Dr Recio who did his ablation.  Since his A-fib burden is low and his risk for stroke is low he thinks he can come off of Eliquis and go to just 81 mg aspirin daily.  I will change it in his chart.  He does not feel like he needs the watchman and his stroke risk is not high enough to qualify for the Watchman procedure at this time.

## 2024-05-21 NOTE — TELEPHONE ENCOUNTER
Called patient and went over Dr. Trujillo's note with him, went over medication instructions with him. Patient verbalized understanding. Advised of follow up appt with Dr. Recio in Feb 2025 and to call us if he needs us sooner. Verbalized understanding.

## 2024-05-31 ENCOUNTER — DOCUMENTATION (OUTPATIENT)
Dept: DIABETES SERVICES | Facility: HOSPITAL | Age: 64
End: 2024-05-31
Payer: COMMERCIAL

## 2024-05-31 ENCOUNTER — HOSPITAL ENCOUNTER (OUTPATIENT)
Dept: DIABETES SERVICES | Facility: HOSPITAL | Age: 64
Discharge: HOME | End: 2024-05-31
Payer: COMMERCIAL

## 2024-05-31 NOTE — PLAN OF CARE
Phone follow up completed and patient successful at chosen goals for self management of prediabetes. He has been encouraged to contact this office to address any barriers or obstacles that may occur. He will be supported as needed.

## 2024-07-01 RX ORDER — HYDROCHLOROTHIAZIDE 25 MG/1
25 TABLET ORAL DAILY
Qty: 90 TABLET | Refills: 1 | Status: SHIPPED | OUTPATIENT
Start: 2024-07-01

## 2024-08-07 RX ORDER — LOSARTAN POTASSIUM 100 MG/1
100 TABLET ORAL DAILY
Qty: 90 TABLET | Refills: 0 | Status: SHIPPED | OUTPATIENT
Start: 2024-08-07

## 2024-08-19 ENCOUNTER — TELEPHONE (OUTPATIENT)
Dept: CARDIOLOGY | Facility: CLINIC | Age: 64
End: 2024-08-19

## 2024-08-19 ENCOUNTER — TELEPHONE (OUTPATIENT)
Dept: FAMILY MEDICINE CLINIC | Facility: CLINIC | Age: 64
End: 2024-08-19
Payer: COMMERCIAL

## 2024-08-19 ENCOUNTER — LAB (OUTPATIENT)
Facility: HOSPITAL | Age: 64
End: 2024-08-19
Payer: COMMERCIAL

## 2024-08-19 DIAGNOSIS — R55 SYNCOPE AND COLLAPSE: Primary | ICD-10-CM

## 2024-08-19 DIAGNOSIS — R55 SYNCOPE AND COLLAPSE: ICD-10-CM

## 2024-08-19 PROCEDURE — 82728 ASSAY OF FERRITIN: CPT

## 2024-08-19 PROCEDURE — 80061 LIPID PANEL: CPT

## 2024-08-19 PROCEDURE — 82746 ASSAY OF FOLIC ACID SERUM: CPT

## 2024-08-19 PROCEDURE — 83735 ASSAY OF MAGNESIUM: CPT

## 2024-08-19 PROCEDURE — 83036 HEMOGLOBIN GLYCOSYLATED A1C: CPT

## 2024-08-19 PROCEDURE — 80050 GENERAL HEALTH PANEL: CPT

## 2024-08-19 PROCEDURE — 82607 VITAMIN B-12: CPT

## 2024-08-19 NOTE — TELEPHONE ENCOUNTER
Patient called stating he had his labs drawn @ [place] for Dr. Trujillo.  Lab told patient that they can check to see if any add'l testing is needed; please advise.  Have about an hour to decide; patient has now decided he will go to get labs re-drawn

## 2024-08-19 NOTE — TELEPHONE ENCOUNTER
Caller: Dave Benitez    Relationship: Self    Best call back number:       [phone] (Mobile)     What is the best time to reach you:     ANY TIME    Who are you requesting to speak with (clinical staff, provider,  specific staff member):     DR JOEL    What was the call regarding:     PATIENT REQUESTED A CALL BACK REGARDING HIS CONCERNS THAT HE FAINTED OVER THE PAST WEEKEND    PATIENT ALSO REQUESTED BLOOD WORK ORDER BE PLACED IN THE SYSTEM

## 2024-08-20 ENCOUNTER — HOSPITAL ENCOUNTER (OUTPATIENT)
Dept: CT IMAGING | Facility: HOSPITAL | Age: 64
Discharge: HOME | End: 2024-08-20
Payer: COMMERCIAL

## 2024-08-20 ENCOUNTER — OFFICE VISIT (OUTPATIENT)
Dept: FAMILY MEDICINE CLINIC | Facility: CLINIC | Age: 64
End: 2024-08-20
Payer: COMMERCIAL

## 2024-08-20 ENCOUNTER — LAB (OUTPATIENT)
Dept: LAB | Facility: HOSPITAL | Age: 64
End: 2024-08-20
Payer: COMMERCIAL

## 2024-08-20 ENCOUNTER — TELEPHONE (OUTPATIENT)
Dept: CARDIOLOGY | Facility: CLINIC | Age: 64
End: 2024-08-20

## 2024-08-20 VITALS
HEART RATE: 88 BPM | OXYGEN SATURATION: 97 % | DIASTOLIC BLOOD PRESSURE: 82 MMHG | HEIGHT: 74 IN | SYSTOLIC BLOOD PRESSURE: 132 MMHG | BODY MASS INDEX: 33.65 KG/M2 | WEIGHT: 262.2 LBS

## 2024-08-20 DIAGNOSIS — R55 SYNCOPE, UNSPECIFIED SYNCOPE TYPE: Primary | ICD-10-CM

## 2024-08-20 DIAGNOSIS — J01.90 SUBACUTE SINUSITIS, UNSPECIFIED LOCATION: ICD-10-CM

## 2024-08-20 DIAGNOSIS — J30.89 ALLERGIC RHINITIS DUE TO OTHER ALLERGIC TRIGGER, UNSPECIFIED SEASONALITY: ICD-10-CM

## 2024-08-20 DIAGNOSIS — R55 SYNCOPE, UNSPECIFIED SYNCOPE TYPE: ICD-10-CM

## 2024-08-20 PROBLEM — Z00.00 ANNUAL PHYSICAL EXAM: Status: RESOLVED | Noted: 2024-05-09 | Resolved: 2024-08-20

## 2024-08-20 LAB
ALBUMIN SERPL-MCNC: 4.5 G/DL (ref 3.5–5.2)
ALBUMIN/GLOB SERPL: 1.4 G/DL
ALP SERPL-CCNC: 98 U/L (ref 39–117)
ALT SERPL W P-5'-P-CCNC: 24 U/L (ref 1–41)
ANION GAP SERPL CALCULATED.3IONS-SCNC: 12.1 MMOL/L (ref 5–15)
AST SERPL-CCNC: 23 U/L (ref 1–40)
BASOPHILS # BLD AUTO: 0.05 10*3/MM3 (ref 0–0.2)
BASOPHILS NFR BLD AUTO: 0.8 % (ref 0–1.5)
BILIRUB SERPL-MCNC: 0.6 MG/DL (ref 0–1.2)
BUN SERPL-MCNC: 16 MG/DL (ref 8–23)
BUN/CREAT SERPL: 16.7 (ref 7–25)
CALCIUM SPEC-SCNC: 9.8 MG/DL (ref 8.6–10.5)
CHLORIDE SERPL-SCNC: 103 MMOL/L (ref 98–107)
CHOLEST SERPL-MCNC: 172 MG/DL (ref 0–200)
CO2 SERPL-SCNC: 21.9 MMOL/L (ref 22–29)
CREAT SERPL-MCNC: 0.96 MG/DL (ref 0.76–1.27)
DEPRECATED RDW RBC AUTO: 40.9 FL (ref 37–54)
EGFRCR SERPLBLD CKD-EPI 2021: 88.8 ML/MIN/1.73
EOSINOPHIL # BLD AUTO: 0.12 10*3/MM3 (ref 0–0.4)
EOSINOPHIL NFR BLD AUTO: 2 % (ref 0.3–6.2)
ERYTHROCYTE [DISTWIDTH] IN BLOOD BY AUTOMATED COUNT: 12 % (ref 12.3–15.4)
FERRITIN SERPL-MCNC: 251 NG/ML (ref 30–400)
FOLATE SERPL-MCNC: 8.19 NG/ML (ref 4.78–24.2)
GLOBULIN UR ELPH-MCNC: 3.2 GM/DL
GLUCOSE SERPL-MCNC: 109 MG/DL (ref 65–99)
HBA1C MFR BLD: 5.7 % (ref 4.8–5.6)
HCT VFR BLD AUTO: 41.9 % (ref 37.5–51)
HDLC SERPL-MCNC: 41 MG/DL (ref 40–60)
HGB BLD-MCNC: 14.8 G/DL (ref 13–17.7)
IMM GRANULOCYTES # BLD AUTO: 0.02 10*3/MM3 (ref 0–0.05)
IMM GRANULOCYTES NFR BLD AUTO: 0.3 % (ref 0–0.5)
LDLC SERPL CALC-MCNC: 113 MG/DL (ref 0–100)
LDLC/HDLC SERPL: 2.71 {RATIO}
LYMPHOCYTES # BLD AUTO: 1.39 10*3/MM3 (ref 0.7–3.1)
LYMPHOCYTES NFR BLD AUTO: 23.5 % (ref 19.6–45.3)
MAGNESIUM SERPL-MCNC: 2.2 MG/DL (ref 1.6–2.4)
MCH RBC QN AUTO: 33 PG (ref 26.6–33)
MCHC RBC AUTO-ENTMCNC: 35.3 G/DL (ref 31.5–35.7)
MCV RBC AUTO: 93.5 FL (ref 79–97)
MONOCYTES # BLD AUTO: 0.57 10*3/MM3 (ref 0.1–0.9)
MONOCYTES NFR BLD AUTO: 9.6 % (ref 5–12)
NEUTROPHILS NFR BLD AUTO: 3.77 10*3/MM3 (ref 1.7–7)
NEUTROPHILS NFR BLD AUTO: 63.8 % (ref 42.7–76)
NRBC BLD AUTO-RTO: 0 /100 WBC (ref 0–0.2)
PLATELET # BLD AUTO: 247 10*3/MM3 (ref 140–450)
PMV BLD AUTO: 9.8 FL (ref 6–12)
POTASSIUM SERPL-SCNC: 3.9 MMOL/L (ref 3.5–5.2)
PROT SERPL-MCNC: 7.7 G/DL (ref 6–8.5)
RBC # BLD AUTO: 4.48 10*6/MM3 (ref 4.14–5.8)
SODIUM SERPL-SCNC: 137 MMOL/L (ref 136–145)
TRIGL SERPL-MCNC: 99 MG/DL (ref 0–150)
TSH SERPL DL<=0.05 MIU/L-ACNC: 1.24 UIU/ML (ref 0.27–4.2)
VIT B12 BLD-MCNC: 458 PG/ML (ref 211–946)
VLDLC SERPL-MCNC: 18 MG/DL (ref 5–40)
WBC NRBC COR # BLD AUTO: 5.92 10*3/MM3 (ref 3.4–10.8)

## 2024-08-20 PROCEDURE — 87086 URINE CULTURE/COLONY COUNT: CPT

## 2024-08-20 PROCEDURE — 93000 ELECTROCARDIOGRAM COMPLETE: CPT | Performed by: INTERNAL MEDICINE

## 2024-08-20 PROCEDURE — 70450 CT HEAD/BRAIN W/O DYE: CPT

## 2024-08-20 PROCEDURE — 99214 OFFICE O/P EST MOD 30 MIN: CPT | Performed by: INTERNAL MEDICINE

## 2024-08-20 PROCEDURE — 81001 URINALYSIS AUTO W/SCOPE: CPT

## 2024-08-20 RX ORDER — APIXABAN 5 MG/1
1 TABLET, FILM COATED ORAL EVERY 12 HOURS SCHEDULED
COMMUNITY
Start: 2024-08-05 | End: 2024-08-20

## 2024-08-20 RX ORDER — AMOXICILLIN AND CLAVULANATE POTASSIUM 875; 125 MG/1; MG/1
1 TABLET, FILM COATED ORAL 2 TIMES DAILY
Qty: 20 TABLET | Refills: 0 | Status: SHIPPED | OUTPATIENT
Start: 2024-08-20 | End: 2024-08-30

## 2024-08-20 RX ORDER — METHYLPREDNISOLONE 4 MG/1
TABLET ORAL
Qty: 1 EACH | Refills: 0 | Status: SHIPPED | OUTPATIENT
Start: 2024-08-20

## 2024-08-20 RX ORDER — LOSARTAN POTASSIUM 50 MG/1
50 TABLET ORAL DAILY
Qty: 90 TABLET | Refills: 1 | Status: SHIPPED | OUTPATIENT
Start: 2024-08-20

## 2024-08-20 NOTE — PROGRESS NOTES
Dave Benitez  1960  [id number]  Patient Care Team:  Vega Ash MD as PCP - General (Internal Medicine)  Shen Recio DO as Consulting Physician (Cardiology)  Bethanie Trujillo MD as Consulting Physician (Cardiology)    Dave Benitez is a 64 y.o. male here today for follow up.     This patient is accompanied by their self who contributes to the history of their care.    Chief Complaint:    Chief Complaint   Patient presents with    Loss of Consciousness     Over the weekend    head pressure        History of Present Illness:  I have reviewed and/or updated the patient's past medical, past surgical, family, social history, problem list and allergies as appropriate.     This gentleman is here for syncope He is followed by cardiology and electrophysiology. He has a history of ablative therapy for atrial fibrillation and flutter. He is on chronic anticoagulation. He also has primary hypertension for which she takes losartan and hydrochlorothiazide. He is also maintained on Eliquis for his history of A-fib. Apparently has history of Coe's esophagus and is maintained on omeprazole 20 mg daily. Finally he is on CPAP for obstructive sleep apnea. He maintains compliance with this     Saturday am about 30 min after taking am meds, he had got out of his truck to open gait. He apparently passed out awakening on the ground. He had hit his head bleeding from scrape. Unwitnessed. No bowel or bladder control.  He then appeared at [facility] door. He was pale and diaphoretic. His bp was 150/85 Hr 96 and BS was 138.  His daughter felt she had to repeat herself- he was slow to process. She made him sit in recliner and drink water. He napped all day. He was thirsty all day Saturday and Sunday.   He denies history of seizure  Sunday while sitting on the commode he felt clammy, and dizzy. No syncope- felt odd and went an sat in his recliner.  At no time did he have palpitation or chest pain. He  "denies recent fevers, chills.      He has had no recent nausea, vomiting or diarrhea. Denies any recent weakness on one side of the body, numbness or tingling.  He denies abdominal pain or blood in his stool    He has head fullness recently- he has had sever congestion- proceded this event. He has frontal, max and ear pressure.     He has had intermittent lightheaded and dizziness- this is followed by diaphoresis  Review of Systems   Constitutional:  Positive for diaphoresis and fatigue. Negative for activity change, appetite change and fever.   Eyes: Negative.    Respiratory:  Negative for shortness of breath.    Cardiovascular:  Negative for chest pain and palpitations.   Gastrointestinal: Negative.    Genitourinary: Negative.    Neurological:  Positive for syncope and light-headedness. Negative for seizures, weakness, numbness and memory problem.       Vitals:    08/20/24 1127   BP: 132/82   Pulse: 88   SpO2: 97%   Weight: 119 kg (262 lb 3.2 oz)   Height: 188 cm (74.02\")     Body mass index is 33.65 kg/m².    Physical Exam  Vitals and nursing note reviewed.   Constitutional:       General: He is not in acute distress.     Appearance: He is well-developed. He is not diaphoretic.   HENT:      Head: Normocephalic and atraumatic.      Right Ear: Tympanic membrane and external ear normal.      Left Ear: Tympanic membrane and external ear normal.      Mouth/Throat:      Pharynx: No oropharyngeal exudate.      Comments: Mucopurulent postnasal drainage  Eyes:      General: No scleral icterus.        Right eye: No discharge.      Conjunctiva/sclera: Conjunctivae normal.   Neck:      Thyroid: No thyromegaly.      Vascular: No JVD.      Trachea: No tracheal deviation.   Cardiovascular:      Rate and Rhythm: Normal rate and regular rhythm.      Pulses: Normal pulses.      Heart sounds: Normal heart sounds.      Comments: PMI nondisplaced  Pulmonary:      Effort: Pulmonary effort is normal.      Breath sounds: Normal breath " sounds. No wheezing or rales.   Abdominal:      General: Bowel sounds are normal.      Palpations: Abdomen is soft.      Tenderness: There is no abdominal tenderness. There is no guarding or rebound.   Musculoskeletal:      Cervical back: Normal range of motion and neck supple.   Lymphadenopathy:      Cervical: No cervical adenopathy.   Skin:     General: Skin is warm and dry.      Capillary Refill: Capillary refill takes less than 2 seconds.      Coloration: Skin is not pale.      Findings: No rash.   Neurological:      Mental Status: He is alert and oriented to person, place, and time.      Motor: No abnormal muscle tone.      Coordination: Coordination normal.   Psychiatric:         Mood and Affect: Mood normal.         Behavior: Behavior normal.         Judgment: Judgment normal.           ECG 12 Lead    Date/Time: 8/20/2024 12:45 PM  Performed by: Vega Ash MD    Authorized by: Vega Ash MD  Comparison: compared with previous ECG from 2/11/2024  Similar to previous ECG  Comparison to previous ECG: Compared to 2/11/2024 no significant change noted.  Still with inferior T wave abnormalities.  Rhythm: sinus rhythm  Rate: normal  T inversion: II and aVF  QRS axis: normal  Other findings: T wave abnormality    Clinical impression: abnormal EKG          Results Review:    I reviewed the patient's new clinical results.        Component  Ref Range & Units 1 d ago  (8/19/24)   Glucose  65 - 99 mg/dL 109 High    BUN  8 - 23 mg/dL 16   Creatinine  0.76 - 1.27 mg/dL 0.96   Sodium  136 - 145 mmol/L 137   Potassium  3.5 - 5.2 mmol/L 3.9   Chloride  98 - 107 mmol/L 103   CO2  22.0 - 29.0 mmol/L 21.9 Low    Calcium  8.6 - 10.5 mg/dL 9.8   Total Protein  6.0 - 8.5 g/dL 7.7   Albumin  3.5 - 5.2 g/dL 4.5   ALT (SGPT)  1 - 41 U/L 24   AST (SGOT)  1 - 40 U/L 23   Alkaline Phosphatase  39 - 117 U/L 98   Total Bilirubin  0.0 - 1.2 mg/dL 0.6   Globulin  gm/dL 3.2   A/G Ratio  g/dL 1.4   BUN/Creatinine Ratio  7.0 - 25.0  16.7   Anion Gap  5.0 - 15.0 mmol/L 12.1   eGFR  >60.0 mL/min/1.73 88.8        Component  Ref Range & Units 1 d ago   WBC  3.40 - 10.80 10*3/mm3 5.92   RBC  4.14 - 5.80 10*6/mm3 4.48   Hemoglobin  13.0 - 17.7 g/dL 14.8   Hematocrit  37.5 - 51.0 % 41.9   MCV  79.0 - 97.0 fL 93.5   MCH  26.6 - 33.0 pg 33.0   MCHC  31.5 - 35.7 g/dL 35.3   RDW  12.3 - 15.4 % 12.0 Low    RDW-SD  37.0 - 54.0 fl 40.9   MPV  6.0 - 12.0 fL 9.8   Platelets  140 - 450 10*3/mm3 247   tatus: Final result          Next appt: 08/22/2024 at 11:00 AM in Cardiology (Bethanie Trujillo MD)       Dx: Syncope and collapse    Specimen Information: Blood   0 Result Notes      Component  Ref Range & Units 1 d ago   Magnesium  1.6 - 2.4 mg/dL 2.2      0 Result Notes      Component  Ref Range & Units 1 d ago   TSH  0.270 - 4.200 uIU/mL 1.240        omponent  Ref Range & Units 1 d ago 4 mo ago   Hemoglobin A1C  4.80 - 5.60 % 5.70 High  6.00 High      Assessment/Plan:    Problem List Items Addressed This Visit    None  Visit Diagnoses       Syncope, unspecified syncope type    -  Primary    Relevant Orders    Urinalysis With Microscopic - Urine, Clean Catch    Urine Culture - Urine, Urine, Clean Catch    Ambulatory Referral to [facility] [place] - [facility]    CT Head Without Contrast    Allergic rhinitis due to other allergic trigger, unspecified seasonality        Relevant Medications    methylPREDNISolone (MEDROL) 4 MG dose pack    Subacute sinusitis, unspecified location        Relevant Medications    amoxicillin-clavulanate (AUGMENTIN) 875-125 MG per tablet    Other Relevant Orders    Ambulatory Referral to Allergy        Syncope, differential quite broad.  Have sent him to the heart valve center for syncope clinic.-Likely would benefit from prolonged monitoring.  Could be simple dehydration/vasovagal, and focus on drinking at least 80 ounces of water daily.  Slow position changes, decrease losartan to 50 mg daily.  Recommended CT of his brain.   No driving until seen at heart valve/cardiology.    Placed him on amoxicillin clavulanic acid empirically for sinusitis.    Plan of care reviewed with patient at the conclusion of today's visit. Education was provided regarding diagnosis and management.  Patient verbalizes understanding of and agreement with management plan.    No follow-ups on file.    Vega Ash MD      Please note than portions of this note were completed wt a Voice Recognition Program

## 2024-08-21 LAB
BACTERIA UR QL AUTO: NORMAL /HPF
BILIRUB UR QL STRIP: NEGATIVE
CLARITY UR: CLEAR
COLOR UR: YELLOW
GLUCOSE UR STRIP-MCNC: NEGATIVE MG/DL
HGB UR QL STRIP.AUTO: NEGATIVE
HYALINE CASTS UR QL AUTO: NORMAL /LPF
KETONES UR QL STRIP: NEGATIVE
LEUKOCYTE ESTERASE UR QL STRIP.AUTO: NEGATIVE
NITRITE UR QL STRIP: NEGATIVE
PH UR STRIP.AUTO: 7 [PH] (ref 5–8)
PROT UR QL STRIP: NEGATIVE
RBC # UR STRIP: NORMAL /HPF
REF LAB TEST METHOD: NORMAL
SP GR UR STRIP: 1.01 (ref 1–1.03)
SQUAMOUS #/AREA URNS HPF: NORMAL /HPF
UROBILINOGEN UR QL STRIP: NORMAL
WBC # UR STRIP: NORMAL /HPF

## 2024-08-22 ENCOUNTER — OFFICE VISIT (OUTPATIENT)
Age: 64
End: 2024-08-22
Payer: COMMERCIAL

## 2024-08-22 VITALS
WEIGHT: 260 LBS | OXYGEN SATURATION: 96 % | DIASTOLIC BLOOD PRESSURE: 90 MMHG | BODY MASS INDEX: 33.37 KG/M2 | SYSTOLIC BLOOD PRESSURE: 150 MMHG | HEIGHT: 74 IN | HEART RATE: 87 BPM

## 2024-08-22 DIAGNOSIS — R55 SYNCOPE AND COLLAPSE: Primary | ICD-10-CM

## 2024-08-22 DIAGNOSIS — I48.0 PAROXYSMAL ATRIAL FIBRILLATION: ICD-10-CM

## 2024-08-22 DIAGNOSIS — I10 PRIMARY HYPERTENSION: ICD-10-CM

## 2024-08-22 LAB — BACTERIA SPEC AEROBE CULT: NO GROWTH

## 2024-08-22 PROCEDURE — 99214 OFFICE O/P EST MOD 30 MIN: CPT | Performed by: INTERNAL MEDICINE

## 2024-08-22 NOTE — PROGRESS NOTES
[facility] Provider Note     Date:   2024   Name: Dave Benitez  :   1960  PCP: Vega Ash MD    Chief Complaint   Patient presents with    Atrial Fibrillation     BP issues       Subjective     History of Present Illness  Dave Benitez is a 64 y.o. male who presents today for passing out.   Woke up one day, had not eaten, took meds and then went out in the  and opened the gate and woke up on the ground. Felt hot and flushed afterwards. In retrospect he states the truck was pretty hot that morning. He was only out a few minutes according to his phone. Hit his head. Took blood sugar 138 and BP at his daughters and he was normal BP. He was extremely thirst for the several days afterwards.   Also had a dizzy spell a few days later. The first day they were significnat. Felt a little wave and knot in his stomach and then would go away.     No chest pain, no fluttering, no a fib. Otherwsie has been normal except of sinus issues. Does notice his heart rate is high when he thinks it should not be, like 100bpm when he is sitting down after dinner. Sinus issues recently diagnosed and treated by PCP, and that seems to have helped the dizziness, he is much better now.     1. PAF - Redo EP study 2022 with Redo PVI, RFA of  left atrial roof, left atrial posterior wall, 2 separate SVTs atrial tachycardias near coronary sinus. s.p PVI re do with Dr. Recio  2. ANGELITO on pap  3. HTN  4. LHC  [facility]  5. Syncope episode 2024    Allergies   Allergen Reactions    Lisinopril Cough       Current Outpatient Medications:     amoxicillin-clavulanate (AUGMENTIN) 875-125 MG per tablet, Take 1 tablet by mouth 2 (Two) Times a Day for 10 days., Disp: 20 tablet, Rfl: 0    aspirin 81 MG EC tablet, Take 1 tablet by mouth Daily., Disp: , Rfl:     hydroCHLOROthiazide 25 MG tablet, Take 1 tablet by mouth once daily, Disp: 90 tablet, Rfl: 1    ibuprofen (ADVIL,MOTRIN) 400 MG  "tablet, Take 1 tablet by mouth Every 6 (Six) Hours As Needed for Mild Pain., Disp: , Rfl:     Loratadine 10 MG capsule, Take 1 capsule by mouth Daily., Disp: , Rfl:     losartan (Cozaar) 50 MG tablet, Take 1 tablet by mouth Daily., Disp: 90 tablet, Rfl: 1    methylPREDNISolone (MEDROL) 4 MG dose pack, Take as directed on package instructions., Disp: 1 each, Rfl: 0    omeprazole (priLOSEC) 20 MG capsule, Take 1 capsule by mouth Daily., Disp: 90 capsule, Rfl: 2    tadalafil (Cialis) 5 MG tablet, Take 1 tablet by mouth Daily., Disp: 30 tablet, Rfl: 3    Past Medical History:   Diagnosis Date    Colon cancer     12 years ago    Colon polyp     Hypertension     Sleep apnea     baseline AHI 18      Past Surgical History:   Procedure Laterality Date    ABLATION OF DYSRHYTHMIC FOCUS N/A 11/07/2014    x3    CARDIAC ELECTROPHYSIOLOGY PROCEDURE N/A 11/17/2022    Procedure: Ablation atrial fibrillation +1 hour. Hold flecainide 3 days prior.;  Surgeon: Shen Recio DO;  Location: [facility] INVASIVE LOCATION;  Service: Cardiovascular;  Laterality: N/A;    COLON RESECTION N/A     HERNIA REPAIR N/A     MENISCECTOMY       Family History   Problem Relation Age of Onset    Stroke Mother     Esophageal cancer Father      Social History     Socioeconomic History    Marital status:    Tobacco Use    Smoking status: Never     Passive exposure: Never    Smokeless tobacco: Former     Types: Chew     Quit date: 1982   Vaping Use    Vaping status: Never Used   Substance and Sexual Activity    Alcohol use: Yes     Comment: Rare    Drug use: Never    Sexual activity: Defer       Objective     Vital Signs:  /90 (BP Location: Right arm, Patient Position: Sitting, Cuff Size: Adult)   Pulse 87   Ht 188 cm (74\")   Wt 118 kg (260 lb)   SpO2 96%   BMI 33.38 kg/m²   Estimated body mass index is 33.38 kg/m² as calculated from the following:    Height as of this encounter: 188 cm (74\").    Weight as of this encounter: 118 kg (260 " lb).         Physical Exam  Constitutional:       Appearance: Normal appearance. He is well-developed.   HENT:      Head: Normocephalic and atraumatic.   Eyes:      General: No scleral icterus.     Pupils: Pupils are equal, round, and reactive to light.   Cardiovascular:      Rate and Rhythm: Normal rate and regular rhythm.      Heart sounds: Normal heart sounds. No murmur heard.  Pulmonary:      Breath sounds: Normal breath sounds. No wheezing or rhonchi.   Musculoskeletal:      Right lower leg: No edema.      Left lower leg: No edema.   Skin:     Capillary Refill: Capillary refill takes less than 2 seconds.      Coloration: Skin is not cyanotic.      Nails: There is no clubbing.   Neurological:      Mental Status: He is alert and oriented to person, place, and time.      Motor: No weakness.      Gait: Gait normal.   Psychiatric:         Mood and Affect: Mood normal.         Behavior: Behavior is cooperative.         Thought Content: Thought content normal.         Cognition and Memory: Memory normal.           Radiologic studies reviewed: CT brain and Labs reviewed: CMP, CBC, TSH          Assessment and Plan     Diagnoses and all orders for this visit:    1. Syncope and collapse (Primary)  Comments:  Suspect a combination of vasovagal, sinusitis, and dehydration. Will check EF and check for arrhythmias on monitor.  Orders:  -     Holter Monitor - 72 Hour Up To 15 Days  -     Adult Transthoracic Echo Complete W/ Cont if Necessary Per Protocol; Future    2. Paroxysmal atrial fibrillation  Comments:  Checking monitor. S/p ablation. on asa alone.    3. Primary hypertension  Comments:  running high but will allow HTN with recent syncope. If consistently high at next OV will go back up on losartan.              Follow Up  Return in about 3 weeks (around 9/12/2024) for Recheck symptoms, with testing at that appointment.    Bethanie Trujillo MD   08/22/2024     Please note that this explicitly excludes time spent on other  separate billable services such as performing procedures or test interpretation, when applicable.    This note was created using dictation software which occasionally transcribes nonsensical phrases. Please contact the provider if any clarification is needed.

## 2024-08-28 ENCOUNTER — APPOINTMENT (OUTPATIENT)
Facility: HOSPITAL | Age: 64
End: 2024-08-28
Payer: COMMERCIAL

## 2024-08-28 ENCOUNTER — HOSPITAL ENCOUNTER (EMERGENCY)
Facility: HOSPITAL | Age: 64
Discharge: HOME | End: 2024-08-28
Attending: EMERGENCY MEDICINE
Payer: COMMERCIAL

## 2024-08-28 VITALS
HEART RATE: 68 BPM | RESPIRATION RATE: 18 BRPM | BODY MASS INDEX: 33.4 KG/M2 | SYSTOLIC BLOOD PRESSURE: 128 MMHG | TEMPERATURE: 97.6 F | DIASTOLIC BLOOD PRESSURE: 84 MMHG | HEIGHT: 73 IN | WEIGHT: 252 LBS | OXYGEN SATURATION: 98 %

## 2024-08-28 DIAGNOSIS — R55 SYNCOPE AND COLLAPSE: Primary | ICD-10-CM

## 2024-08-28 LAB
ALBUMIN SERPL-MCNC: 4.4 G/DL (ref 3.5–5.2)
ALBUMIN/GLOB SERPL: 1.3 G/DL
ALP SERPL-CCNC: 103 U/L (ref 39–117)
ALT SERPL W P-5'-P-CCNC: 34 U/L (ref 1–41)
ANION GAP SERPL CALCULATED.3IONS-SCNC: 12.9 MMOL/L (ref 5–15)
AST SERPL-CCNC: 32 U/L (ref 1–40)
BASOPHILS # BLD AUTO: 0.02 10*3/MM3 (ref 0–0.2)
BASOPHILS NFR BLD AUTO: 0.3 % (ref 0–1.5)
BILIRUB SERPL-MCNC: 0.4 MG/DL (ref 0–1.2)
BUN SERPL-MCNC: 17 MG/DL (ref 8–23)
BUN/CREAT SERPL: 20.2 (ref 7–25)
CALCIUM SPEC-SCNC: 9.6 MG/DL (ref 8.6–10.5)
CHLORIDE SERPL-SCNC: 97 MMOL/L (ref 98–107)
CO2 SERPL-SCNC: 24.1 MMOL/L (ref 22–29)
CREAT SERPL-MCNC: 0.84 MG/DL (ref 0.76–1.27)
DEPRECATED RDW RBC AUTO: 41.3 FL (ref 37–54)
EGFRCR SERPLBLD CKD-EPI 2021: 98 ML/MIN/1.73
EOSINOPHIL # BLD AUTO: 0.14 10*3/MM3 (ref 0–0.4)
EOSINOPHIL NFR BLD AUTO: 1.8 % (ref 0.3–6.2)
ERYTHROCYTE [DISTWIDTH] IN BLOOD BY AUTOMATED COUNT: 12.1 % (ref 12.3–15.4)
GEN 5 2HR TROPONIN T REFLEX: 15 NG/L
GLOBULIN UR ELPH-MCNC: 3.3 GM/DL
GLUCOSE SERPL-MCNC: 111 MG/DL (ref 65–99)
HCT VFR BLD AUTO: 41.8 % (ref 37.5–51)
HGB BLD-MCNC: 14.9 G/DL (ref 13–17.7)
HOLD SPECIMEN: NORMAL
IMM GRANULOCYTES # BLD AUTO: 0.02 10*3/MM3 (ref 0–0.05)
IMM GRANULOCYTES NFR BLD AUTO: 0.3 % (ref 0–0.5)
LIPASE SERPL-CCNC: 27 U/L (ref 13–60)
LYMPHOCYTES # BLD AUTO: 1.69 10*3/MM3 (ref 0.7–3.1)
LYMPHOCYTES NFR BLD AUTO: 21.4 % (ref 19.6–45.3)
MCH RBC QN AUTO: 32.3 PG (ref 26.6–33)
MCHC RBC AUTO-ENTMCNC: 35.6 G/DL (ref 31.5–35.7)
MCV RBC AUTO: 90.7 FL (ref 79–97)
MONOCYTES # BLD AUTO: 0.72 10*3/MM3 (ref 0.1–0.9)
MONOCYTES NFR BLD AUTO: 9.1 % (ref 5–12)
NEUTROPHILS NFR BLD AUTO: 5.3 10*3/MM3 (ref 1.7–7)
NEUTROPHILS NFR BLD AUTO: 67.1 % (ref 42.7–76)
NT-PROBNP SERPL-MCNC: <36 PG/ML (ref 0–900)
PLATELET # BLD AUTO: 251 10*3/MM3 (ref 140–450)
PMV BLD AUTO: 9 FL (ref 6–12)
POTASSIUM SERPL-SCNC: 3.2 MMOL/L (ref 3.5–5.2)
PROT SERPL-MCNC: 7.7 G/DL (ref 6–8.5)
RBC # BLD AUTO: 4.61 10*6/MM3 (ref 4.14–5.8)
SODIUM SERPL-SCNC: 134 MMOL/L (ref 136–145)
TROPONIN T DELTA: -2 NG/L
TROPONIN T SERPL HS-MCNC: 17 NG/L
WBC NRBC COR # BLD AUTO: 7.89 10*3/MM3 (ref 3.4–10.8)
WHOLE BLOOD HOLD COAG: NORMAL
WHOLE BLOOD HOLD SPECIMEN: NORMAL

## 2024-08-28 PROCEDURE — 80053 COMPREHEN METABOLIC PANEL: CPT | Performed by: EMERGENCY MEDICINE

## 2024-08-28 PROCEDURE — 83880 ASSAY OF NATRIURETIC PEPTIDE: CPT | Performed by: EMERGENCY MEDICINE

## 2024-08-28 PROCEDURE — 36415 COLL VENOUS BLD VENIPUNCTURE: CPT

## 2024-08-28 PROCEDURE — 93005 ELECTROCARDIOGRAM TRACING: CPT | Performed by: EMERGENCY MEDICINE

## 2024-08-28 PROCEDURE — 93005 ELECTROCARDIOGRAM TRACING: CPT

## 2024-08-28 PROCEDURE — 84484 ASSAY OF TROPONIN QUANT: CPT | Performed by: EMERGENCY MEDICINE

## 2024-08-28 PROCEDURE — 99284 EMERGENCY DEPT VISIT MOD MDM: CPT

## 2024-08-28 PROCEDURE — 85025 COMPLETE CBC W/AUTO DIFF WBC: CPT | Performed by: EMERGENCY MEDICINE

## 2024-08-28 PROCEDURE — 83690 ASSAY OF LIPASE: CPT | Performed by: EMERGENCY MEDICINE

## 2024-08-28 PROCEDURE — 71045 X-RAY EXAM CHEST 1 VIEW: CPT

## 2024-08-28 PROCEDURE — 25810000003 LACTATED RINGERS SOLUTION: Performed by: EMERGENCY MEDICINE

## 2024-08-28 RX ORDER — SODIUM CHLORIDE 0.9 % (FLUSH) 0.9 %
10 SYRINGE (ML) INJECTION AS NEEDED
Status: DISCONTINUED | OUTPATIENT
Start: 2024-08-28 | End: 2024-08-29 | Stop reason: HOSPADM

## 2024-08-28 RX ORDER — ASPIRIN 81 MG/1
324 TABLET, CHEWABLE ORAL ONCE
Status: COMPLETED | OUTPATIENT
Start: 2024-08-28 | End: 2024-08-28

## 2024-08-28 RX ADMIN — SODIUM CHLORIDE, POTASSIUM CHLORIDE, SODIUM LACTATE AND CALCIUM CHLORIDE 1000 ML: 600; 310; 30; 20 INJECTION, SOLUTION INTRAVENOUS at 21:09

## 2024-08-28 RX ADMIN — ASPIRIN 81 MG 324 MG: 81 TABLET ORAL at 19:00

## 2024-08-28 NOTE — FSED PROVIDER NOTE
Subjective  History of Present Illness:    Patient presents with syncope.  This been ongoing for approximately 2 weeks now has been on the heat on and off with symptoms ongoing.  Actually followed up with his primary care was treated with antibiotics and steroids and followed up with his cardiologist who placed a Holter monitor on the patient is due to follow-up tomorrow.  Had orthostatic vital signs done which were all normal patient states this happens out of nowhere with no preceding chest pain shortness of breath or any other symptoms.  No numbness tingling  Extremities no nausea vomiting abdominal pain chest pain shortness of breath.  Patient does have a history of A-fib      Nurses Notes reviewed and agree, including vitals, allergies, social history and prior medical history.     REVIEW OF SYSTEMS: All systems reviewed and not pertinent unless noted.  Review of Systems    Past Medical History:   Diagnosis Date    Colon cancer     12 years ago    Colon polyp     Hypertension     Sleep apnea     baseline AHI 18       Allergies:    Lisinopril      Past Surgical History:   Procedure Laterality Date    ABLATION OF DYSRHYTHMIC FOCUS N/A 11/07/2014    x3    CARDIAC ELECTROPHYSIOLOGY PROCEDURE N/A 11/17/2022    Procedure: Ablation atrial fibrillation +1 hour. Hold flecainide 3 days prior.;  Surgeon: Shen Recio DO;  Location: [facility] INVASIVE LOCATION;  Service: Cardiovascular;  Laterality: N/A;    COLON RESECTION N/A     HERNIA REPAIR N/A     MENISCECTOMY           Social History     Socioeconomic History    Marital status:    Tobacco Use    Smoking status: Never     Passive exposure: Never    Smokeless tobacco: Former     Types: Chew     Quit date: 1982   Vaping Use    Vaping status: Never Used   Substance and Sexual Activity    Alcohol use: Yes     Comment: Rare    Drug use: Never    Sexual activity: Defer         Family History   Problem Relation Age of Onset    Stroke Mother     Esophageal cancer  "Father        Objective  Physical Exam:  /84   Pulse 68   Temp 97.6 °F (36.4 °C) (Oral)   Resp 18   Ht 185.4 cm (73\")   Wt 114 kg (252 lb)   SpO2 98%   BMI 33.25 kg/m²      Physical Exam  Vitals and nursing note reviewed.   Constitutional:       General: He is not in acute distress.     Appearance: He is well-developed. He is not ill-appearing, toxic-appearing or diaphoretic.   HENT:      Head: Normocephalic and atraumatic.      Mouth/Throat:      Mouth: Mucous membranes are moist.   Eyes:      Pupils: Pupils are equal, round, and reactive to light.   Cardiovascular:      Rate and Rhythm: Normal rate and regular rhythm.      Pulses: Normal pulses.      Heart sounds: Normal heart sounds.   Pulmonary:      Effort: Pulmonary effort is normal. No tachypnea, accessory muscle usage or respiratory distress.      Breath sounds: Normal breath sounds. No decreased breath sounds, wheezing, rhonchi or rales.   Abdominal:      Palpations: Abdomen is soft.      Tenderness: There is no abdominal tenderness. There is no guarding or rebound.   Musculoskeletal:         General: Normal range of motion.      Right lower leg: No edema.   Skin:     General: Skin is warm and dry.      Capillary Refill: Capillary refill takes less than 2 seconds.   Neurological:      General: No focal deficit present.      Mental Status: He is alert and oriented to person, place, and time.   Psychiatric:         Mood and Affect: Mood normal.         Behavior: Behavior normal.         ECG 12 Lead ED Triage Standing Order; Chest Pain      Date/Time: 8/28/2024 7:44 PM    Performed by: Kevin Valdez DO  Authorized by: Kevin Valdez DO  Interpreted by ED physician  Rhythm: sinus rhythm  Rate: normal  QRS axis: normal  ST Segments: ST segments normal  T Waves: T waves normal  Other: no other findings  Clinical impression: normal ECG        ED Course:    ED Course as of 08/29/24 1934   Wed Aug 28, 2024   2125 EKG interpreted by ED ED physician " normal sinus rhythm rate of 72 normal axis T wave inversion lead III aVF no STEMI no ST segment elevation or depression [DM]      ED Course User Index  [DM] Kevin Valdez DO       Lab Results (last 24 hours)       Procedure Component Value Units Date/Time    High Sensitivity Troponin T 2Hr [[id number]]  (Normal) Collected: 08/28/24 2106    Specimen: Blood from Arm, Right Updated: 08/28/24 2134     HS Troponin T 15 ng/L      Troponin T Delta -2 ng/L              XR Chest 1 View    Result Date: 8/28/2024  XR CHEST 1 VW Date of Exam: 8/28/2024 6:47 PM EDT Indication: Chest Pain Triage Protocol Comparison: 2/9/2024 at 1116 hours FINDINGS: No new consolidations or pleural effusions are observed. The cardiac silhouette and mediastinum are stable. No acute osseous abnormalities are identified.     Impression: No evidence for acute cardiopulmonary process. Electronically Signed: Colin Hillman MD  8/28/2024 8:32 PM EDT  Workstation ID: [id number]        MDM     Amount and/or Complexity of Data Reviewed  Clinical lab tests: reviewed  Tests in the radiology section of CPT®: reviewed  Tests in the medicine section of CPT®: reviewed      Concern for arrhythmia ACS pneumothorax electrolyte dysfunction.  Patient presenting with episodes of syncope.  Patient otherwise well-appearing EKG shows no acute ischemia or arrhythmia patient vital signs are also normal limits with good blood pressure.  Patient already is in the process of being worked up for this has a Holter monitor currently chest x-ray is negative with any opacity or other acute process both 0 and 2-hour troponin are negative CBC CMP are unremarkable.  Patient feeling well upon my assessment.  Patient already has follow-up scheduled patient given strict return cautions discharge well-appearing      Data interpreted: Nursing notes reviewed, vital signs reviewed.  Labs independently interpreted by me (CBC, CMP, lipase, UA, troponin, ABG, lactic acid, procalcitonin).   Imaging independently interpreted by me (x-ray, CT scan).  EKG independently interpreted by me.  O2 saturation:    Counseling: Discussed the results above with the patient regarding need for admission or discharge.  Patient understands and agrees plan of care.      -----  ED Disposition       ED Disposition   Discharge    Condition   Stable    Comment   --             Final diagnoses:   Syncope and collapse      Your Follow-Up Providers       Vega Ash MD In 1 week.    Specialty: Internal Medicine  [address] [place] [address]  [phone]                       Contact information for after-discharge care    Follow-up information has not been specified.                    Your medication list        CONTINUE taking these medications        Instructions Last Dose Given Next Dose Due   amoxicillin-clavulanate 875-125 MG per tablet  Commonly known as: AUGMENTIN      Take 1 tablet by mouth 2 (Two) Times a Day for 10 days.       aspirin 81 MG EC tablet      Take 1 tablet by mouth Daily.       hydroCHLOROthiazide 25 MG tablet      Take 1 tablet by mouth once daily       ibuprofen 400 MG tablet  Commonly known as: ADVIL,MOTRIN      Take 1 tablet by mouth Every 6 (Six) Hours As Needed for Mild Pain.       Loratadine 10 MG capsule      Take 1 capsule by mouth Daily.       losartan 50 MG tablet  Commonly known as: Cozaar      Take 1 tablet by mouth Daily.       methylPREDNISolone 4 MG dose pack  Commonly known as: MEDROL      Take as directed on package instructions.       omeprazole 20 MG capsule  Commonly known as: priLOSEC      Take 1 capsule by mouth Daily.       tadalafil 5 MG tablet  Commonly known as: Cialis      Take 1 tablet by mouth Daily.

## 2024-08-29 ENCOUNTER — TELEPHONE (OUTPATIENT)
Dept: CARDIOLOGY | Facility: CLINIC | Age: 64
End: 2024-08-29
Payer: COMMERCIAL

## 2024-08-29 DIAGNOSIS — R42 DIZZINESS: Primary | ICD-10-CM

## 2024-08-29 LAB
QT INTERVAL: 374 MS
QT INTERVAL: 390 MS
QTC INTERVAL: 427 MS
QTC INTERVAL: 436 MS

## 2024-08-29 NOTE — TELEPHONE ENCOUNTER
Patient was seen in  ER yesterday. Patient stated that he has been dizzy since he was last seen. Patient stated that his holter monitor is coming off today. Patient stated that the ER doctor said that they could find anything and sent him home.     Can we move appt and echo up?

## 2024-08-29 NOTE — TELEPHONE ENCOUNTER
Patient's echo and follow has been moved up to next Thursday in [place] along with the carotid u/s. Patient is aware of appt and that he is seeing arik. He is also mailing his heart monitor back today.

## 2024-09-05 ENCOUNTER — OFFICE VISIT (OUTPATIENT)
Dept: CARDIOLOGY | Facility: CLINIC | Age: 64
End: 2024-09-05
Payer: COMMERCIAL

## 2024-09-05 VITALS
BODY MASS INDEX: 34.19 KG/M2 | OXYGEN SATURATION: 99 % | WEIGHT: 258 LBS | HEIGHT: 73 IN | DIASTOLIC BLOOD PRESSURE: 84 MMHG | SYSTOLIC BLOOD PRESSURE: 138 MMHG | HEART RATE: 97 BPM

## 2024-09-05 DIAGNOSIS — I48.91 ATRIAL FIBRILLATION AND FLUTTER: ICD-10-CM

## 2024-09-05 DIAGNOSIS — I48.92 ATRIAL FIBRILLATION AND FLUTTER: ICD-10-CM

## 2024-09-05 DIAGNOSIS — R55 SYNCOPE AND COLLAPSE: ICD-10-CM

## 2024-09-05 DIAGNOSIS — I10 PRIMARY HYPERTENSION: Chronic | ICD-10-CM

## 2024-09-05 DIAGNOSIS — Q24.5 CORONARY SINUS ABNORMALITY: Primary | ICD-10-CM

## 2024-09-05 NOTE — ASSESSMENT & PLAN NOTE
Echo 9/5/2024 small left atrium with predominant coronary sinus collaborated with Dr. Trujillo she would like a cardiac MRI and a right heart cath.

## 2024-09-05 NOTE — ASSESSMENT & PLAN NOTE
Hypertension is stable and controlled  Continue current treatment regimen.  Doing well on the losartan 50 mg.  Blood pressure will be reassessed in 6 months.

## 2024-09-05 NOTE — PROGRESS NOTES
[facility] Provider Note     Date:   2024  Name: Dave Benitez  :   1960  PCP: Vega Ash MD    Chief Complaint   Patient presents with   • Loss of Consciousness       Subjective     History of Present Illness  Dave Benitez is a 64 y.o. male who presents today for dizziness.    Patient is here today with his daughter.  He states that he is no longer driving due to having the episodes of syncope.  Patient states after he saw Dr. Trujillo on 2024 he was seen in [facility] ER for syncope and collapse.  He states at which time he was told that he needed to stay hydrated.  His CT scan of his head on 2024 showed normal sinuses and no cranial abnormalities.  He states since his last ER visit it has not been as bad he says it does not feel like the world is spinning but like his equilibrium is off.  Carotid duplex has been reviewed with patient in detail, but does not show a reason for his syncope and collapse.  Holter monitor showed some nonsustained arrhythmias.  Collaborated with Dr. Trujillo and she has reviewed his echo that was done today which showed a small left atrium with predominant coronary sinus which was not shown in previous coronary CTA or echo.  She would like a cardiac MRI and a right heart cath.    Patient denies any chest pain, shortness of breath, or edema.  He states that he does think he feels some occasional palpitations.  Patient states since he has been on the lower dose of the losartan his blood pressure has been doing very well at home.    1. PAF - Redo EP study 2022 with Redo PVI, RFA of  left atrial roof, left atrial posterior wall, 2 separate SVTs atrial tachycardias near coronary sinus. s.p PVI re do with Dr. Recio    2. ANGELITO 23 baseline AHI 18  Current settings 8-18 cm    3. HTN    4. LHC  [facility]    5. Syncope episode 24 carotid duplex Right ICA: Imaging indicates normal flow without  evidence of hemodynamically significant stenosis.   · Right Vertebral: Antegrade flow is present.   · Right Subclavian: Imaging indicates patent flow.   · Left ICA: Imaging indicates <50% stenosis.   · Left Vertebral: Antegrade flow is present.   · Left Subclavian: Imaging indicates patent flow.     Echo 9/5/2024  Left ventricular systolic function is normal. Left ventricular ejection fraction appears to be 61 - 65%.  ·  Left ventricular wall thickness is consistent with borderline septal asymmetric hypertrophy.  ·  Left ventricular diastolic function is consistent with (grade I) impaired relaxation.  ·  Estimated right ventricular systolic pressure from tricuspid regurgitation is normal (<35 mmHg).  ·  Mild dilation of the aortic root is present. Mild dilation of the sinuses of Valsalva is present. Mild dilation of the ascending aorta is present.  ·  Small left atrium with prominent coronary sinus.    Holter monitor 8/22/2024  A normal monitor study.  Some nonsustained arrhythmias. No pauses and no significant bradycardia. No reason for syncope seen.       Reports Denies   Chest Pain [] [x]   Shortness of Air [] [x]   Palpitations [x]occasional []   Edema [] [x]   Dizziness [x] []   Syncope [x] []         Allergies   Allergen Reactions   • Lisinopril Cough       Current Outpatient Medications:   •  aspirin 81 MG EC tablet, Take 1 tablet by mouth Daily., Disp: , Rfl:   •  hydroCHLOROthiazide 25 MG tablet, Take 1 tablet by mouth once daily, Disp: 90 tablet, Rfl: 1  •  ibuprofen (ADVIL,MOTRIN) 400 MG tablet, Take 1 tablet by mouth Every 6 (Six) Hours As Needed for Mild Pain., Disp: , Rfl:   •  Loratadine 10 MG capsule, Take 1 capsule by mouth Daily., Disp: , Rfl:   •  losartan (Cozaar) 50 MG tablet, Take 1 tablet by mouth Daily., Disp: 90 tablet, Rfl: 1  •  omeprazole (priLOSEC) 20 MG capsule, Take 1 capsule by mouth Daily., Disp: 90 capsule, Rfl: 2  •  tadalafil (Cialis) 5 MG tablet, Take 1 tablet by mouth Daily.,  "Disp: 30 tablet, Rfl: 3    Past Medical History:   Diagnosis Date   • Colon cancer     12 years ago   • Colon polyp    • Hypertension    • Sleep apnea     baseline AHI 18      Past Surgical History:   Procedure Laterality Date   • ABLATION OF DYSRHYTHMIC FOCUS N/A 11/07/2014    x3   • CARDIAC ELECTROPHYSIOLOGY PROCEDURE N/A 11/17/2022    Procedure: Ablation atrial fibrillation +1 hour. Hold flecainide 3 days prior.;  Surgeon: Shen Recio DO;  Location: [facility] INVASIVE LOCATION;  Service: Cardiovascular;  Laterality: N/A;   • COLON RESECTION N/A    • HERNIA REPAIR N/A    • MENISCECTOMY       Family History   Problem Relation Age of Onset   • Stroke Mother    • Esophageal cancer Father      Social History     Socioeconomic History   • Marital status:    Tobacco Use   • Smoking status: Never     Passive exposure: Never   • Smokeless tobacco: Former     Types: Chew     Quit date: 1982   Vaping Use   • Vaping status: Never Used   Substance and Sexual Activity   • Alcohol use: Yes     Comment: Rare   • Drug use: Never   • Sexual activity: Defer       Objective     Vital Signs:  /84   Pulse 97   Ht 185.4 cm (73\")   Wt 117 kg (258 lb)   SpO2 99%   BMI 34.04 kg/m²   Estimated body mass index is 34.04 kg/m² as calculated from the following:    Height as of this encounter: 185.4 cm (73\").    Weight as of this encounter: 117 kg (258 lb).             Physical Exam  Constitutional:       Appearance: Normal appearance. He is obese.   Cardiovascular:      Rate and Rhythm: Normal rate and regular rhythm.      Pulses: Normal pulses.      Heart sounds: Normal heart sounds.   Pulmonary:      Effort: Pulmonary effort is normal.      Breath sounds: Normal breath sounds.   Musculoskeletal:         General: Normal range of motion.      Cervical back: Normal range of motion.      Right lower leg: No edema.      Left lower leg: No edema.   Skin:     General: Skin is warm and dry.      Capillary Refill: Capillary " refill takes less than 2 seconds.   Neurological:      General: No focal deficit present.      Mental Status: He is alert and oriented to person, place, and time. Mental status is at baseline. He is disoriented.   Psychiatric:         Mood and Affect: Mood normal.         Behavior: Behavior normal.         Thought Content: Thought content normal.         Judgment: Judgment normal.       The following data was reviewed by: LANE Vilchis on 09/05/2024:    Carotid artery duplex has been reviewed and discussed with patient in detail.    Holter monitor has been reviewed and discussed with patient in detail.    Echo has been reviewed and discussed with patient in detail.            Assessment and Plan     Diagnoses and all orders for this visit:    1. Coronary sinus abnormality (Primary)  Assessment & Plan:  Echo 9/5/2024 small left atrium with predominant coronary sinus collaborated with Dr. Trujillo she would like a cardiac MRI and a right heart cath.    Orders:  -     MRI Cardiac Function Complete With & Without Morphology; Future  -     Case Request Cath Lab: Right Heart Cath    2. Syncope and collapse  Assessment & Plan:  Patient had an episode 8/28/2024 this was after his last visit with us.  He states it does not feel like things are swirling it feels like his equilibrium is off then he just blacks out.  CT scan of head was normal.  Carotid artery duplex showed less than 50% stenosis.    Orders:  -     Ambulatory Referral to Neurology    3. Primary hypertension  Assessment & Plan:  Hypertension is stable and controlled  Continue current treatment regimen.  Doing well on the losartan 50 mg.  Blood pressure will be reassessed in 6 months.      4. Atrial fibrillation and flutter  Assessment & Plan:  S/P PVI 11/17/2022.  Recent Holter monitor 8/22/2024 showed normal monitor study.  Some nonsustained arrhythmias no pauses and no significant bradycardia.            Recommendations: ER if symptoms increase and Report  if any new/changing symptoms immediately          Follow Up  Return in about 6 weeks (around 10/17/2024) for Next scheduled follow up.  Patient was given instructions and counseling regarding his condition or for health maintenance advice. Please see specific information pulled into the AVS if appropriate.

## 2024-09-05 NOTE — H&P (VIEW-ONLY)
[facility] Provider Note     Date:   2024  Name: Dave Benitez  :   1960  PCP: Vega Ash MD    Chief Complaint   Patient presents with   • Loss of Consciousness       Subjective     History of Present Illness  Dave Benitez is a 64 y.o. male who presents today for dizziness.    Patient is here today with his daughter.  He states that he is no longer driving due to having the episodes of syncope.  Patient states after he saw Dr. Trujillo on 2024 he was seen in [facility] ER for syncope and collapse.  He states at which time he was told that he needed to stay hydrated.  His CT scan of his head on 2024 showed normal sinuses and no cranial abnormalities.  He states since his last ER visit it has not been as bad he says it does not feel like the world is spinning but like his equilibrium is off.  Carotid duplex has been reviewed with patient in detail, but does not show a reason for his syncope and collapse.  Holter monitor showed some nonsustained arrhythmias.  Collaborated with Dr. Trujillo and she has reviewed his echo that was done today which showed a small left atrium with predominant coronary sinus which was not shown in previous coronary CTA or echo.  She would like a cardiac MRI and a right heart cath.    Patient denies any chest pain, shortness of breath, or edema.  He states that he does think he feels some occasional palpitations.  Patient states since he has been on the lower dose of the losartan his blood pressure has been doing very well at home.    1. PAF - Redo EP study 2022 with Redo PVI, RFA of  left atrial roof, left atrial posterior wall, 2 separate SVTs atrial tachycardias near coronary sinus. s.p PVI re do with Dr. Recio    2. ANGELITO 23 baseline AHI 18  Current settings 8-18 cm    3. HTN    4. LHC  [facility]    5. Syncope episode 24 carotid duplex Right ICA: Imaging indicates normal flow without  evidence of hemodynamically significant stenosis.   · Right Vertebral: Antegrade flow is present.   · Right Subclavian: Imaging indicates patent flow.   · Left ICA: Imaging indicates <50% stenosis.   · Left Vertebral: Antegrade flow is present.   · Left Subclavian: Imaging indicates patent flow.     Echo 9/5/2024  Left ventricular systolic function is normal. Left ventricular ejection fraction appears to be 61 - 65%.  ·  Left ventricular wall thickness is consistent with borderline septal asymmetric hypertrophy.  ·  Left ventricular diastolic function is consistent with (grade I) impaired relaxation.  ·  Estimated right ventricular systolic pressure from tricuspid regurgitation is normal (<35 mmHg).  ·  Mild dilation of the aortic root is present. Mild dilation of the sinuses of Valsalva is present. Mild dilation of the ascending aorta is present.  ·  Small left atrium with prominent coronary sinus.    Holter monitor 8/22/2024  A normal monitor study.  Some nonsustained arrhythmias. No pauses and no significant bradycardia. No reason for syncope seen.       Reports Denies   Chest Pain [] [x]   Shortness of Air [] [x]   Palpitations [x]occasional []   Edema [] [x]   Dizziness [x] []   Syncope [x] []         Allergies   Allergen Reactions   • Lisinopril Cough       Current Outpatient Medications:   •  aspirin 81 MG EC tablet, Take 1 tablet by mouth Daily., Disp: , Rfl:   •  hydroCHLOROthiazide 25 MG tablet, Take 1 tablet by mouth once daily, Disp: 90 tablet, Rfl: 1  •  ibuprofen (ADVIL,MOTRIN) 400 MG tablet, Take 1 tablet by mouth Every 6 (Six) Hours As Needed for Mild Pain., Disp: , Rfl:   •  Loratadine 10 MG capsule, Take 1 capsule by mouth Daily., Disp: , Rfl:   •  losartan (Cozaar) 50 MG tablet, Take 1 tablet by mouth Daily., Disp: 90 tablet, Rfl: 1  •  omeprazole (priLOSEC) 20 MG capsule, Take 1 capsule by mouth Daily., Disp: 90 capsule, Rfl: 2  •  tadalafil (Cialis) 5 MG tablet, Take 1 tablet by mouth Daily.,  "Disp: 30 tablet, Rfl: 3    Past Medical History:   Diagnosis Date   • Colon cancer     12 years ago   • Colon polyp    • Hypertension    • Sleep apnea     baseline AHI 18      Past Surgical History:   Procedure Laterality Date   • ABLATION OF DYSRHYTHMIC FOCUS N/A 11/07/2014    x3   • CARDIAC ELECTROPHYSIOLOGY PROCEDURE N/A 11/17/2022    Procedure: Ablation atrial fibrillation +1 hour. Hold flecainide 3 days prior.;  Surgeon: Shen Recio DO;  Location: [facility] INVASIVE LOCATION;  Service: Cardiovascular;  Laterality: N/A;   • COLON RESECTION N/A    • HERNIA REPAIR N/A    • MENISCECTOMY       Family History   Problem Relation Age of Onset   • Stroke Mother    • Esophageal cancer Father      Social History     Socioeconomic History   • Marital status:    Tobacco Use   • Smoking status: Never     Passive exposure: Never   • Smokeless tobacco: Former     Types: Chew     Quit date: 1982   Vaping Use   • Vaping status: Never Used   Substance and Sexual Activity   • Alcohol use: Yes     Comment: Rare   • Drug use: Never   • Sexual activity: Defer       Objective     Vital Signs:  /84   Pulse 97   Ht 185.4 cm (73\")   Wt 117 kg (258 lb)   SpO2 99%   BMI 34.04 kg/m²   Estimated body mass index is 34.04 kg/m² as calculated from the following:    Height as of this encounter: 185.4 cm (73\").    Weight as of this encounter: 117 kg (258 lb).             Physical Exam  Constitutional:       Appearance: Normal appearance. He is obese.   Cardiovascular:      Rate and Rhythm: Normal rate and regular rhythm.      Pulses: Normal pulses.      Heart sounds: Normal heart sounds.   Pulmonary:      Effort: Pulmonary effort is normal.      Breath sounds: Normal breath sounds.   Musculoskeletal:         General: Normal range of motion.      Cervical back: Normal range of motion.      Right lower leg: No edema.      Left lower leg: No edema.   Skin:     General: Skin is warm and dry.      Capillary Refill: Capillary " refill takes less than 2 seconds.   Neurological:      General: No focal deficit present.      Mental Status: He is alert and oriented to person, place, and time. Mental status is at baseline. He is disoriented.   Psychiatric:         Mood and Affect: Mood normal.         Behavior: Behavior normal.         Thought Content: Thought content normal.         Judgment: Judgment normal.       The following data was reviewed by: LANE Vilchis on 09/05/2024:    Carotid artery duplex has been reviewed and discussed with patient in detail.    Holter monitor has been reviewed and discussed with patient in detail.    Echo has been reviewed and discussed with patient in detail.            Assessment and Plan     Diagnoses and all orders for this visit:    1. Coronary sinus abnormality (Primary)  Assessment & Plan:  Echo 9/5/2024 small left atrium with predominant coronary sinus collaborated with Dr. Trujillo she would like a cardiac MRI and a right heart cath.    Orders:  -     MRI Cardiac Function Complete With & Without Morphology; Future  -     Case Request Cath Lab: Right Heart Cath    2. Syncope and collapse  Assessment & Plan:  Patient had an episode 8/28/2024 this was after his last visit with us.  He states it does not feel like things are swirling it feels like his equilibrium is off then he just blacks out.  CT scan of head was normal.  Carotid artery duplex showed less than 50% stenosis.    Orders:  -     Ambulatory Referral to Neurology    3. Primary hypertension  Assessment & Plan:  Hypertension is stable and controlled  Continue current treatment regimen.  Doing well on the losartan 50 mg.  Blood pressure will be reassessed in 6 months.      4. Atrial fibrillation and flutter  Assessment & Plan:  S/P PVI 11/17/2022.  Recent Holter monitor 8/22/2024 showed normal monitor study.  Some nonsustained arrhythmias no pauses and no significant bradycardia.            Recommendations: ER if symptoms increase and Report  if any new/changing symptoms immediately          Follow Up  Return in about 6 weeks (around 10/17/2024) for Next scheduled follow up.  Patient was given instructions and counseling regarding his condition or for health maintenance advice. Please see specific information pulled into the AVS if appropriate.

## 2024-09-05 NOTE — ASSESSMENT & PLAN NOTE
Patient had an episode 8/28/2024 this was after his last visit with us.  He states it does not feel like things are swirling it feels like his equilibrium is off then he just blacks out.  CT scan of head was normal.  Carotid artery duplex showed less than 50% stenosis.

## 2024-09-05 NOTE — ASSESSMENT & PLAN NOTE
S/P PVI 11/17/2022.  Recent Holter monitor 8/22/2024 showed normal monitor study.  Some nonsustained arrhythmias no pauses and no significant bradycardia.

## 2024-09-06 DIAGNOSIS — I48.92 ATRIAL FIBRILLATION AND FLUTTER: ICD-10-CM

## 2024-09-06 DIAGNOSIS — I48.91 ATRIAL FIBRILLATION AND FLUTTER: ICD-10-CM

## 2024-09-06 RX ORDER — APIXABAN 5 MG/1
5 TABLET, FILM COATED ORAL 2 TIMES DAILY
Qty: 60 TABLET | Refills: 0 | OUTPATIENT
Start: 2024-09-06

## 2024-09-09 ENCOUNTER — PREP FOR SURGERY (OUTPATIENT)
Dept: OTHER | Facility: HOSPITAL | Age: 64
End: 2024-09-09
Payer: COMMERCIAL

## 2024-09-09 DIAGNOSIS — R55 SYNCOPE AND COLLAPSE: Primary | ICD-10-CM

## 2024-09-09 RX ORDER — ONDANSETRON 2 MG/ML
4 INJECTION INTRAMUSCULAR; INTRAVENOUS EVERY 6 HOURS PRN
Status: CANCELLED | OUTPATIENT
Start: 2024-09-09

## 2024-09-09 RX ORDER — SODIUM CHLORIDE 9 MG/ML
40 INJECTION, SOLUTION INTRAVENOUS AS NEEDED
Status: CANCELLED | OUTPATIENT
Start: 2024-09-09

## 2024-09-09 RX ORDER — SODIUM CHLORIDE 0.9 % (FLUSH) 0.9 %
10 SYRINGE (ML) INJECTION EVERY 12 HOURS SCHEDULED
Status: CANCELLED | OUTPATIENT
Start: 2024-09-09

## 2024-09-09 RX ORDER — SODIUM CHLORIDE 0.9 % (FLUSH) 0.9 %
1-10 SYRINGE (ML) INJECTION AS NEEDED
Status: CANCELLED | OUTPATIENT
Start: 2024-09-09

## 2024-09-09 RX ORDER — NITROGLYCERIN 0.4 MG/1
0.4 TABLET SUBLINGUAL
Status: CANCELLED | OUTPATIENT
Start: 2024-09-09

## 2024-09-12 ENCOUNTER — HOSPITAL ENCOUNTER (OUTPATIENT)
Facility: HOSPITAL | Age: 64
Setting detail: HOSPITAL OUTPATIENT SURGERY
Discharge: HOME | End: 2024-09-12
Attending: INTERNAL MEDICINE | Admitting: INTERNAL MEDICINE
Payer: COMMERCIAL

## 2024-09-12 VITALS
TEMPERATURE: 98.1 F | BODY MASS INDEX: 33.62 KG/M2 | WEIGHT: 262 LBS | SYSTOLIC BLOOD PRESSURE: 126 MMHG | HEART RATE: 73 BPM | DIASTOLIC BLOOD PRESSURE: 82 MMHG | OXYGEN SATURATION: 93 % | RESPIRATION RATE: 16 BRPM | HEIGHT: 74 IN

## 2024-09-12 DIAGNOSIS — Q24.5 CORONARY SINUS ABNORMALITY: ICD-10-CM

## 2024-09-12 DIAGNOSIS — R55 SYNCOPE AND COLLAPSE: ICD-10-CM

## 2024-09-12 LAB
ALBUMIN SERPL-MCNC: 4 G/DL (ref 3.5–5.2)
ALBUMIN/GLOB SERPL: 1.6 G/DL
ALP SERPL-CCNC: 191 U/L (ref 39–117)
ALT SERPL W P-5'-P-CCNC: 174 U/L (ref 1–41)
ANION GAP SERPL CALCULATED.3IONS-SCNC: 10 MMOL/L (ref 5–15)
AST SERPL-CCNC: 71 U/L (ref 1–40)
ATMOSPHERIC PRESS: ABNORMAL MM[HG]
BASE EXCESS BLDV CALC-SCNC: 4.4 MMOL/L (ref -2–2)
BASE EXCESS BLDV CALC-SCNC: 4.5 MMOL/L (ref -2–2)
BASE EXCESS BLDV CALC-SCNC: 4.6 MMOL/L (ref -2–2)
BASE EXCESS BLDV CALC-SCNC: 4.7 MMOL/L (ref -2–2)
BASE EXCESS BLDV CALC-SCNC: 4.7 MMOL/L (ref -2–2)
BASE EXCESS BLDV CALC-SCNC: 5.1 MMOL/L (ref -2–2)
BDY SITE: ABNORMAL
BILIRUB SERPL-MCNC: 0.5 MG/DL (ref 0–1.2)
BODY TEMPERATURE: 37
BUN SERPL-MCNC: 11 MG/DL (ref 8–23)
BUN/CREAT SERPL: 11.8 (ref 7–25)
CALCIUM SPEC-SCNC: 9.2 MG/DL (ref 8.6–10.5)
CHLORIDE SERPL-SCNC: 100 MMOL/L (ref 98–107)
CO2 BLDA-SCNC: 28.2 MMOL/L (ref 22–33)
CO2 BLDA-SCNC: 29.3 MMOL/L (ref 22–33)
CO2 BLDA-SCNC: 29.5 MMOL/L (ref 22–33)
CO2 BLDA-SCNC: 29.8 MMOL/L (ref 22–33)
CO2 BLDA-SCNC: 29.9 MMOL/L (ref 22–33)
CO2 BLDA-SCNC: 30.2 MMOL/L (ref 22–33)
CO2 SERPL-SCNC: 27 MMOL/L (ref 22–29)
COHGB MFR BLD: 1.5 %
CREAT BLDA-MCNC: 0.8 MG/DL (ref 0.6–1.3)
CREAT SERPL-MCNC: 0.93 MG/DL (ref 0.76–1.27)
DEPRECATED RDW RBC AUTO: 45.3 FL (ref 37–54)
EGFRCR SERPLBLD CKD-EPI 2021: 92.3 ML/MIN/1.73
EPAP: 0
ERYTHROCYTE [DISTWIDTH] IN BLOOD BY AUTOMATED COUNT: 12.7 % (ref 12.3–15.4)
GLOBULIN UR ELPH-MCNC: 2.5 GM/DL
GLUCOSE SERPL-MCNC: 111 MG/DL (ref 65–99)
HCO3 BLDV-SCNC: 27.2 MMOL/L (ref 22–28)
HCO3 BLDV-SCNC: 28.1 MMOL/L (ref 22–28)
HCO3 BLDV-SCNC: 28.3 MMOL/L (ref 22–28)
HCO3 BLDV-SCNC: 28.6 MMOL/L (ref 22–28)
HCO3 BLDV-SCNC: 28.7 MMOL/L (ref 22–28)
HCO3 BLDV-SCNC: 29 MMOL/L (ref 22–28)
HCT VFR BLD AUTO: 39.1 % (ref 37.5–51)
HGB BLD-MCNC: 13.5 G/DL (ref 13–17.7)
HGB BLDA-MCNC: 13.4 G/DL (ref 13.5–17.5)
HGB BLDA-MCNC: 13.8 G/DL (ref 13.5–17.5)
HGB BLDA-MCNC: 13.8 G/DL (ref 13.5–17.5)
HGB BLDA-MCNC: 14.2 G/DL (ref 13.5–17.5)
HGB BLDA-MCNC: 16 G/DL (ref 13.5–17.5)
HGB BLDA-MCNC: 16.2 G/DL (ref 13.5–17.5)
INHALED O2 CONCENTRATION: 21 %
IPAP: 0
Lab: ABNORMAL
MCH RBC QN AUTO: 33.3 PG (ref 26.6–33)
MCHC RBC AUTO-ENTMCNC: 34.5 G/DL (ref 31.5–35.7)
MCV RBC AUTO: 96.5 FL (ref 79–97)
METHGB BLD QL: 0.3 %
METHGB BLD QL: 0.3 %
METHGB BLD QL: 0.5 %
METHGB BLD QL: 0.6 %
MODALITY: ABNORMAL
NOTIFIED BY: ABNORMAL
NOTIFIED WHO: ABNORMAL
OXYHGB MFR BLDV: 71.4 %
OXYHGB MFR BLDV: 74.1 %
OXYHGB MFR BLDV: 75.2 %
OXYHGB MFR BLDV: 75.9 %
OXYHGB MFR BLDV: 78.3 %
OXYHGB MFR BLDV: 81.7 %
PAW @ PEAK INSP FLOW SETTING VENT: 0 CMH2O
PCO2 BLDV: 33.8 MM HG (ref 41–51)
PCO2 BLDV: 37.2 MM HG (ref 41–51)
PCO2 BLDV: 38.1 MM HG (ref 41–51)
PCO2 BLDV: 38.8 MM HG (ref 41–51)
PCO2 BLDV: 39.1 MM HG (ref 41–51)
PCO2 BLDV: 40.7 MM HG (ref 41–51)
PH BLDV: 7.46 PH UNITS (ref 7.31–7.41)
PH BLDV: 7.47 PH UNITS (ref 7.31–7.41)
PH BLDV: 7.47 PH UNITS (ref 7.31–7.41)
PH BLDV: 7.49 PH UNITS (ref 7.31–7.41)
PH BLDV: 7.49 PH UNITS (ref 7.31–7.41)
PH BLDV: 7.51 PH UNITS (ref 7.31–7.41)
PLATELET # BLD AUTO: 204 10*3/MM3 (ref 140–450)
PMV BLD AUTO: 9.2 FL (ref 6–12)
PO2 BLDV: 36.2 MM HG (ref 27–53)
PO2 BLDV: 37 MM HG (ref 27–53)
PO2 BLDV: 37.9 MM HG (ref 27–53)
PO2 BLDV: 38.5 MM HG (ref 27–53)
PO2 BLDV: 41.1 MM HG (ref 27–53)
PO2 BLDV: 42.5 MM HG (ref 27–53)
POTASSIUM SERPL-SCNC: 3.9 MMOL/L (ref 3.5–5.2)
PROT SERPL-MCNC: 6.5 G/DL (ref 6–8.5)
RBC # BLD AUTO: 4.05 10*6/MM3 (ref 4.14–5.8)
SODIUM SERPL-SCNC: 137 MMOL/L (ref 136–145)
TOTAL RATE: 0 BREATHS/MINUTE
WBC NRBC COR # BLD AUTO: 5.49 10*3/MM3 (ref 3.4–10.8)

## 2024-09-12 PROCEDURE — 85027 COMPLETE CBC AUTOMATED: CPT | Performed by: NURSE PRACTITIONER

## 2024-09-12 PROCEDURE — 93451 RIGHT HEART CATH: CPT | Performed by: INTERNAL MEDICINE

## 2024-09-12 PROCEDURE — C1751 CATH, INF, PER/CENT/MIDLINE: HCPCS | Performed by: INTERNAL MEDICINE

## 2024-09-12 PROCEDURE — 82565 ASSAY OF CREATININE: CPT

## 2024-09-12 PROCEDURE — 36415 COLL VENOUS BLD VENIPUNCTURE: CPT

## 2024-09-12 PROCEDURE — 80053 COMPREHEN METABOLIC PANEL: CPT | Performed by: NURSE PRACTITIONER

## 2024-09-12 PROCEDURE — 82805 BLOOD GASES W/O2 SATURATION: CPT

## 2024-09-12 PROCEDURE — C1894 INTRO/SHEATH, NON-LASER: HCPCS | Performed by: INTERNAL MEDICINE

## 2024-09-12 RX ORDER — ACETAMINOPHEN 325 MG/1
650 TABLET ORAL EVERY 4 HOURS PRN
Status: DISCONTINUED | OUTPATIENT
Start: 2024-09-12 | End: 2024-09-12 | Stop reason: HOSPADM

## 2024-09-12 RX ORDER — SODIUM CHLORIDE 0.9 % (FLUSH) 0.9 %
1-10 SYRINGE (ML) INJECTION AS NEEDED
Status: DISCONTINUED | OUTPATIENT
Start: 2024-09-12 | End: 2024-09-12 | Stop reason: HOSPADM

## 2024-09-12 RX ORDER — SODIUM CHLORIDE 9 MG/ML
40 INJECTION, SOLUTION INTRAVENOUS AS NEEDED
Status: DISCONTINUED | OUTPATIENT
Start: 2024-09-12 | End: 2024-09-12 | Stop reason: HOSPADM

## 2024-09-12 RX ORDER — LIDOCAINE HYDROCHLORIDE 10 MG/ML
INJECTION, SOLUTION EPIDURAL; INFILTRATION; INTRACAUDAL; PERINEURAL
Status: DISCONTINUED | OUTPATIENT
Start: 2024-09-12 | End: 2024-09-12 | Stop reason: HOSPADM

## 2024-09-12 RX ORDER — SODIUM CHLORIDE 0.9 % (FLUSH) 0.9 %
10 SYRINGE (ML) INJECTION EVERY 12 HOURS SCHEDULED
Status: DISCONTINUED | OUTPATIENT
Start: 2024-09-12 | End: 2024-09-12 | Stop reason: HOSPADM

## 2024-09-12 RX ORDER — ONDANSETRON 2 MG/ML
4 INJECTION INTRAMUSCULAR; INTRAVENOUS EVERY 6 HOURS PRN
Status: DISCONTINUED | OUTPATIENT
Start: 2024-09-12 | End: 2024-09-12 | Stop reason: HOSPADM

## 2024-09-12 RX ORDER — NITROGLYCERIN 0.4 MG/1
0.4 TABLET SUBLINGUAL
Status: DISCONTINUED | OUTPATIENT
Start: 2024-09-12 | End: 2024-09-12 | Stop reason: HOSPADM

## 2024-09-12 NOTE — CONSULTS
Diabetes Education    Patient Name:  Dave Benitez  YOB: 1960  MRN: [id number]  Admit Date:  9/12/2024        Pt does not meet criteria for diabetes education. Current A1c is 5.7 noted during chart review. Pt is on no home meds for diabetes and has no documented history of diabetes. Thank you.      Electronically signed by:  Michelle Tovar RN  09/12/24 11:45 EDT

## 2024-09-12 NOTE — INTERVAL H&P NOTE
H&P reviewed. The patient was examined and there are no changes to the H&P.  Will proceed with right heart catheterization today via femoral access.  This was discussed with the patient and family.  They verbalized understanding and wished to proceed.  Further recommendations to follow procedure.  Patient notes that he is scheduled for cardiac MRI tomorrow.    LANE Arreola

## 2024-09-14 LAB
QT INTERVAL: 374 MS
QT INTERVAL: 390 MS
QTC INTERVAL: 427 MS
QTC INTERVAL: 436 MS

## 2024-09-16 ENCOUNTER — TELEPHONE (OUTPATIENT)
Dept: CARDIOLOGY | Facility: CLINIC | Age: 64
End: 2024-09-16
Payer: COMMERCIAL

## 2024-09-16 DIAGNOSIS — I50.22 CHRONIC SYSTOLIC HEART FAILURE: Primary | ICD-10-CM

## 2024-09-18 DIAGNOSIS — Q24.5 CORONARY SINUS ABNORMALITY: ICD-10-CM

## 2024-09-18 RX ORDER — METOPROLOL SUCCINATE 25 MG/1
25 TABLET, EXTENDED RELEASE ORAL DAILY
Qty: 30 TABLET | Refills: 11 | Status: SHIPPED | OUTPATIENT
Start: 2024-09-18

## 2024-10-17 ENCOUNTER — OFFICE VISIT (OUTPATIENT)
Dept: CARDIOLOGY | Facility: CLINIC | Age: 64
End: 2024-10-17
Payer: COMMERCIAL

## 2024-10-17 VITALS
WEIGHT: 267.8 LBS | BODY MASS INDEX: 34.37 KG/M2 | HEIGHT: 74 IN | HEART RATE: 90 BPM | DIASTOLIC BLOOD PRESSURE: 72 MMHG | SYSTOLIC BLOOD PRESSURE: 136 MMHG | OXYGEN SATURATION: 95 %

## 2024-10-17 DIAGNOSIS — R42 DIZZINESS: ICD-10-CM

## 2024-10-17 DIAGNOSIS — G47.33 OSA (OBSTRUCTIVE SLEEP APNEA): Primary | ICD-10-CM

## 2024-10-17 DIAGNOSIS — J32.0 CHRONIC MAXILLARY SINUSITIS: ICD-10-CM

## 2024-10-17 DIAGNOSIS — Q24.5 CORONARY SINUS ABNORMALITY: ICD-10-CM

## 2024-10-17 DIAGNOSIS — I10 PRIMARY HYPERTENSION: Chronic | ICD-10-CM

## 2024-10-17 NOTE — ASSESSMENT & PLAN NOTE
Right heart cath showed normal cardiac output with mildly elevated PA pressures and PCWP no O2 saturations set up.    Cardiac MRI showed normal results left ventricular function with minimally depressed global systolic function at 49% no late gadolinium no luteum enhancement of the LV myocardium.  Normal size right ventricle with normal global systolic function RVEF 45%.  Pulmonary regurgitation was small visualized jet.  Regurgitant fraction is estimated to be 20% based on the functional analysis, however flow analysis was not performed.

## 2024-10-17 NOTE — PROGRESS NOTES
[facility] Provider Note     Date:   10/17/2024  Name: Dave Benitez  :   1960  PCP: Vega Ash MD    Chief Complaint   Patient presents with   • Follow-up     Heart cath fu       Subjective     History of Present Illness  Dave Benitez is a 64 y.o. male who presents today to discuss the results of his right heart cath.    Patient is here today with his wife.  We have discussed his current medications and preventative care for pulmonary hypertension and his atrial fibrillation.  Patient's wife states that she has concerns that the metoprolol is causing him to be very mathew and she has noticed when taking his oxygen levels at home they have been decreased.  Patient states that he has been doing well with the metoprolol and feels like it is helping with his atrial fibrillation.  He states that he has not had any more episodes of dizziness.  He states he went to an allergist and was noted to have fluid in his ears and sinus congestion.  He states that he was put on a nasal steroid inhaler.  Patient denies any exertional chest pain or shortness of air, edema, palpitations, presyncope, or syncope.    Patient is wanting to switch to us for his sleep apnea.  He states that he just does not want to go back to [place] to see his sleep doctor.  He states that since all of this has happened he has started wearing his PAP machine faithfully.  Patient states he does not know if it is helping him to sleep better all he knows is he is doing it for a healthier heart.    1. PAF - Redo EP study 2022 with Redo PVI, RFA of  left atrial roof, left atrial posterior wall, 2 separate SVTs atrial tachycardias near coronary sinus. s.p PVI re do with Dr. Recio    2. ANGELITO 23 baseline AHI 18  Current settings 8-18 cm    3. HTN    4. LHC  [facility]    5. Syncope episode 2024    Cardiac MRI 24 normal size left ventricular with minimally depressed global systolic  "function 49% no late gadolinium no luteum enhancement of the LV myocardium.  Normal size right ventricle with normal global systolic function RVEF 45%.  Pulmonary regurgitation with a small visualized jet.  Regurgitant fraction is estimated to be 20% based on functional analysis, however flow analysis was not performed.    9/12/24 RHC ·  Normal cardiac output  ·  Mildly elevated PA pressures and PCWP.  ·  No O2 saturation \"step up\".    9/5/24 carotid duplex Right ICA: Imaging indicates normal flow without evidence of hemodynamically significant stenosis.   · Right Vertebral: Antegrade flow is present.   · Right Subclavian: Imaging indicates patent flow.   · Left ICA: Imaging indicates <50% stenosis.   · Left Vertebral: Antegrade flow is present.   · Left Subclavian: Imaging indicates patent flow.     Echo 9/5/2024  Left ventricular systolic function is normal. Left ventricular ejection fraction appears to be 61 - 65%.  ·  Left ventricular wall thickness is consistent with borderline septal asymmetric hypertrophy.  ·  Left ventricular diastolic function is consistent with (grade I) impaired relaxation.  ·  Estimated right ventricular systolic pressure from tricuspid regurgitation is normal (<35 mmHg).  ·  Mild dilation of the aortic root is present. Mild dilation of the sinuses of Valsalva is present. Mild dilation of the ascending aorta is present.  ·  Small left atrium with prominent coronary sinus.    Holter monitor 8/22/2024  A normal monitor study.  Some nonsustained arrhythmias. No pauses and no significant bradycardia. No reason for syncope seen.       Reports Denies   Chest Pain [] [x]   Shortness of Air [] [x]   Palpitations [] [x]   Edema [] [x]   Dizziness [] [x]   Syncope [] [x]                         Allergies   Allergen Reactions   • Lisinopril Cough       Current Outpatient Medications:   •  aspirin 81 MG EC tablet, Take 1 tablet by mouth Daily., Disp: , Rfl:   •  hydroCHLOROthiazide 25 MG tablet, " Take 1 tablet by mouth once daily, Disp: 90 tablet, Rfl: 1  •  ibuprofen (ADVIL,MOTRIN) 400 MG tablet, Take 1 tablet by mouth Every 6 (Six) Hours As Needed for Mild Pain., Disp: , Rfl:   •  losartan (Cozaar) 50 MG tablet, Take 1 tablet by mouth Daily., Disp: 90 tablet, Rfl: 1  •  metoprolol succinate XL (TOPROL-XL) 25 MG 24 hr tablet, Take 1 tablet by mouth Daily., Disp: 30 tablet, Rfl: 11  •  omeprazole (priLOSEC) 20 MG capsule, Take 1 capsule by mouth Daily., Disp: 90 capsule, Rfl: 2  •  tadalafil (Cialis) 5 MG tablet, Take 1 tablet by mouth Daily. (Patient taking differently: Take 1 tablet by mouth Daily As Needed.), Disp: 30 tablet, Rfl: 3    Past Medical History:   Diagnosis Date   • Colon cancer     12 years ago   • Colon polyp    • Hypertension    • Sleep apnea     baseline AHI 18      Past Surgical History:   Procedure Laterality Date   • ABLATION OF DYSRHYTHMIC FOCUS N/A 11/07/2014    x3   • CARDIAC CATHETERIZATION N/A 9/12/2024    Procedure: Right Heart Cath;  Surgeon: Jakub Rangel MD;  Location:  [facility] CATH INVASIVE LOCATION;  Service: Cardiovascular;  Laterality: N/A;   • CARDIAC ELECTROPHYSIOLOGY PROCEDURE N/A 11/17/2022    Procedure: Ablation atrial fibrillation +1 hour. Hold flecainide 3 days prior.;  Surgeon: Shen Recio DO;  Location:  [facility] EP INVASIVE LOCATION;  Service: Cardiovascular;  Laterality: N/A;   • COLON RESECTION N/A    • HERNIA REPAIR N/A    • MENISCECTOMY       Family History   Problem Relation Age of Onset   • Stroke Mother    • Esophageal cancer Father      Social History     Socioeconomic History   • Marital status:    Tobacco Use   • Smoking status: Never     Passive exposure: Never   • Smokeless tobacco: Former     Types: Chew     Quit date: 1982   Vaping Use   • Vaping status: Never Used   Substance and Sexual Activity   • Alcohol use: Yes     Comment: monthly   • Drug use: Never   • Sexual activity: Defer       Objective     Vital Signs:  /72 (BP Location:  "Left arm, Patient Position: Sitting, Cuff Size: Adult)   Pulse 90   Ht 188 cm (74\")   Wt 121 kg (267 lb 12.8 oz)   SpO2 95%   BMI 34.38 kg/m²   Estimated body mass index is 34.38 kg/m² as calculated from the following:    Height as of this encounter: 188 cm (74\").    Weight as of this encounter: 121 kg (267 lb 12.8 oz).             Physical Exam  Constitutional:       Appearance: Normal appearance.   Pulmonary:      Effort: Pulmonary effort is normal.   Musculoskeletal:      Right lower leg: No edema.      Left lower leg: No edema.   Skin:     General: Skin is warm and dry.      Capillary Refill: Capillary refill takes less than 2 seconds.   Neurological:      General: No focal deficit present.      Mental Status: He is alert and oriented to person, place, and time.   Psychiatric:         Mood and Affect: Mood normal.         Behavior: Behavior normal.         Thought Content: Thought content normal.         Judgment: Judgment normal.         The following data was reviewed by: LANE Vilchis on 10/17/2024:    30-day download 9/17/2024 to 10/16/2024 I have reviewed interpret the data from the download.  Download shows good compliance and control.  Patient is receiving benefit from PAP therapy.  Plan to continue current treatment.          Assessment and Plan     Diagnoses and all orders for this visit:    1. ANGELITO (obstructive sleep apnea) (Primary)  -     PAP Therapy    2. Dizziness  -     Ambulatory Referral to ENT (Otolaryngology)    3. Chronic maxillary sinusitis  -     Ambulatory Referral to ENT (Otolaryngology)    4. Primary hypertension    5. Coronary sinus abnormality  Assessment & Plan:  Right heart cath showed normal cardiac output with mildly elevated PA pressures and PCWP no O2 saturations set up.    Cardiac MRI showed normal results left ventricular function with minimally depressed global systolic function at 49% no late gadolinium no luteum enhancement of the LV myocardium.  Normal size right " ventricle with normal global systolic function RVEF 45%.  Pulmonary regurgitation was small visualized jet.  Regurgitant fraction is estimated to be 20% based on the functional analysis, however flow analysis was not performed.          Recommendations: ER if symptoms increase, Report if any new/changing symptoms immediately, Limit salt, Sleep risks reviewed (driving, medical, sleep death, sedating agents), and Sleep hygiene discussed          Follow Up  Return in about 3 months (around 1/17/2025).  Patient was given instructions and counseling regarding his condition or for health maintenance advice. Please see specific information pulled into the AVS if appropriate.

## 2024-12-23 RX ORDER — HYDROCHLOROTHIAZIDE 25 MG/1
25 TABLET ORAL DAILY
Qty: 90 TABLET | Refills: 1 | Status: SHIPPED | OUTPATIENT
Start: 2024-12-23

## 2024-12-30 ENCOUNTER — OFFICE VISIT (OUTPATIENT)
Dept: CARDIOLOGY | Facility: CLINIC | Age: 64
End: 2024-12-30
Payer: COMMERCIAL

## 2024-12-30 VITALS
WEIGHT: 265 LBS | DIASTOLIC BLOOD PRESSURE: 78 MMHG | HEART RATE: 109 BPM | SYSTOLIC BLOOD PRESSURE: 128 MMHG | OXYGEN SATURATION: 97 % | HEIGHT: 74 IN | BODY MASS INDEX: 34.01 KG/M2

## 2024-12-30 DIAGNOSIS — I48.92 ATRIAL FIBRILLATION AND FLUTTER: Primary | ICD-10-CM

## 2024-12-30 DIAGNOSIS — I48.91 ATRIAL FIBRILLATION AND FLUTTER: Primary | ICD-10-CM

## 2024-12-30 PROCEDURE — 99214 OFFICE O/P EST MOD 30 MIN: CPT | Performed by: NURSE PRACTITIONER

## 2024-12-30 PROCEDURE — 93000 ELECTROCARDIOGRAM COMPLETE: CPT | Performed by: NURSE PRACTITIONER

## 2024-12-30 RX ORDER — LOSARTAN POTASSIUM 100 MG/1
1 TABLET ORAL DAILY
COMMUNITY
Start: 2024-12-09 | End: 2025-01-02

## 2024-12-30 RX ORDER — METOPROLOL SUCCINATE 50 MG/1
50 TABLET, EXTENDED RELEASE ORAL DAILY
Qty: 30 TABLET | Refills: 0 | Status: SHIPPED | OUTPATIENT
Start: 2024-12-30

## 2024-12-30 NOTE — ASSESSMENT & PLAN NOTE
Patient experienced a recent episode of atrial fibrillation.  EKG today shows sinus tachycardia with second-degree AV block, rate of 104 bpm.  A-fib episode  likely due to alcohol intake.  - Increase metoprolol succinate to 50 mg once daily  - 5-day Holter monitor to assess A-fib burden.  He is only taking aspirin 81 mg at this time, we will discuss if Eliquis is needed after reviewing result of monitor.    -Keep follow-up visit with Dr. Trujillo in 3 weeks.   Private car/Awaiting sister for  in .

## 2024-12-30 NOTE — PROGRESS NOTES
"    [facility] Provider Note     Date:   2024   Name: Dave Benitez  :   1960  PCP: Vega Ash MD    Chief Complaint   Patient presents with    Atrial Fibrillation     Since Saturday morning    Shortness of Breath    Chest Pain     PRESSURE       Subjective     History of Present Illness  Dave Benitez is a 64 y.o. male who presents today for follow-up visit on atrial fibrillation.  Patient reports that he experienced an episode of atrial fibrillation Saturday morning when he woke up.  The episode lasted most of the weekend and he is feeling better at this time.  EKG today shows sinus tachycardia with second-degree AV block, heart rate 104 bpm.  He reports that he drank 3 large glasses of wine the night before to social event, which is more than he typically drinks.  The episode started the next morning.  He denies any other cardiac symptoms at this time.  He denies chest pain, shortness of breath, dizziness, lower extremity edema or syncope.  Palpitations have improved.    1. PAF - Redo EP study 2022 with Redo PVI, RFA of  left atrial roof, left atrial posterior wall, 2 separate SVTs atrial tachycardias near coronary sinus. s.p PVI re do with Dr. Recio    2. ANGELITO 23 baseline AHI 18  Current settings 8-18 cm    3. HTN    4. LHC  [facility]    5. Syncope episode 2024    Cardiac MRI 24 normal size left ventricular with minimally depressed global systolic function 49% no late gadolinium no luteum enhancement of the LV myocardium.  Normal size right ventricle with normal global systolic function RVEF 45%.  Pulmonary regurgitation with a small visualized jet.  Regurgitant fraction is estimated to be 20% based on functional analysis, however flow analysis was not performed.    24 RHC ·  Normal cardiac output  ·  Mildly elevated PA pressures and PCWP.  ·  No O2 saturation \"step up\".    24 carotid duplex Right ICA: Imaging indicates " normal flow without evidence of hemodynamically significant stenosis.   · Right Vertebral: Antegrade flow is present.   · Right Subclavian: Imaging indicates patent flow.   · Left ICA: Imaging indicates <50% stenosis.   · Left Vertebral: Antegrade flow is present.   · Left Subclavian: Imaging indicates patent flow.     Echo 9/5/2024  Left ventricular systolic function is normal. Left ventricular ejection fraction appears to be 61 - 65%.  ·  Left ventricular wall thickness is consistent with borderline septal asymmetric hypertrophy.  ·  Left ventricular diastolic function is consistent with (grade I) impaired relaxation.  ·  Estimated right ventricular systolic pressure from tricuspid regurgitation is normal (<35 mmHg).  ·  Mild dilation of the aortic root is present. Mild dilation of the sinuses of Valsalva is present. Mild dilation of the ascending aorta is present.  ·  Small left atrium with prominent coronary sinus.    Holter monitor 8/22/2024  A normal monitor study.  Some nonsustained arrhythmias. No pauses and no significant bradycardia. No reason for syncope seen.       Reports Denies   Chest Pain [] [x]   Shortness of Air [] [x]   Palpitations [x] []   Edema [] [x]   Dizziness [] [x]   Syncope [] [x]       Allergies   Allergen Reactions    Lisinopril Cough       Current Outpatient Medications:     aspirin 81 MG EC tablet, Take 1 tablet by mouth Daily., Disp: , Rfl:     hydroCHLOROthiazide 25 MG tablet, Take 1 tablet by mouth once daily, Disp: 90 tablet, Rfl: 1    ibuprofen (ADVIL,MOTRIN) 400 MG tablet, Take 1 tablet by mouth Every 6 (Six) Hours As Needed for Mild Pain., Disp: , Rfl:     losartan (COZAAR) 100 MG tablet, Take 1 tablet by mouth Daily., Disp: , Rfl:     metoprolol succinate XL (TOPROL-XL) 25 MG 24 hr tablet, Take 1 tablet by mouth Daily., Disp: 30 tablet, Rfl: 11    omeprazole (priLOSEC) 20 MG capsule, Take 1 capsule by mouth Daily., Disp: 90 capsule, Rfl: 2    tadalafil (Cialis) 5 MG tablet,  "Take 1 tablet by mouth Daily. (Patient taking differently: Take 1 tablet by mouth Daily As Needed.), Disp: 30 tablet, Rfl: 3    metoprolol succinate XL (TOPROL-XL) 50 MG 24 hr tablet, Take 1 tablet by mouth Daily., Disp: 30 tablet, Rfl: 0    Past Medical History:   Diagnosis Date    A-fib     Colon cancer     12 years ago    Colon polyp     Hypertension     Sleep apnea     baseline AHI 18      Past Surgical History:   Procedure Laterality Date    ABLATION OF DYSRHYTHMIC FOCUS N/A 11/07/2014    x3    CARDIAC CATHETERIZATION N/A 9/12/2024    Procedure: Right Heart Cath;  Surgeon: Jakub Rangel MD;  Location:  [facility];  Service: Cardiovascular;  Laterality: N/A;    CARDIAC ELECTROPHYSIOLOGY PROCEDURE N/A 11/17/2022    Procedure: Ablation atrial fibrillation +1 hour. Hold flecainide 3 days prior.;  Surgeon: Shen Recio DO;  Location:  [facility];  Service: Cardiovascular;  Laterality: N/A;    COLON RESECTION N/A     HERNIA REPAIR N/A     MENISCECTOMY       Family History   Problem Relation Age of Onset    Stroke Mother     Esophageal cancer Father      Social History     Socioeconomic History    Marital status:    Tobacco Use    Smoking status: Never     Passive exposure: Never    Smokeless tobacco: Former     Types: Chew     Quit date: 1982   Vaping Use    Vaping status: Never Used   Substance and Sexual Activity    Alcohol use: Yes     Comment: monthly    Drug use: Never    Sexual activity: Defer       Objective     Vital Signs:  /78   Pulse 109   Ht 188 cm (74\")   Wt 120 kg (265 lb)   SpO2 97%   BMI 34.02 kg/m²   Estimated body mass index is 34.02 kg/m² as calculated from the following:    Height as of this encounter: 188 cm (74\").    Weight as of this encounter: 120 kg (265 lb).       BMI is >= 30 and <35. (Class 1 Obesity). The following options were offered after discussion;: exercise counseling/recommendations      Physical Exam  Vitals reviewed. "   Constitutional:       Appearance: Normal appearance.   HENT:      Head: Normocephalic.   Cardiovascular:      Rate and Rhythm: Regular rhythm. Tachycardia present.      Heart sounds: Normal heart sounds.   Pulmonary:      Effort: Pulmonary effort is normal.      Breath sounds: Normal breath sounds.   Musculoskeletal:      Right lower leg: No edema.      Left lower leg: No edema.   Skin:     General: Skin is warm and dry.      Capillary Refill: Capillary refill takes less than 2 seconds.   Neurological:      General: No focal deficit present.      Mental Status: He is alert and oriented to person, place, and time.   Psychiatric:         Mood and Affect: Mood normal.         Behavior: Behavior normal.                 ECG 12 Lead    Date/Time: 12/30/2024 9:12 AM  Performed by: Randi Jones APRN    Authorized by: Randi Jones APRN  Comparison: not compared with previous ECG   Previous ECG: no previous ECG available  Rhythm: sinus tachycardia  Rate: tachycardic  BPM: 104  Conduction: 2nd degree AV block (Mobitz 2) and non-specific intraventricular conduction delay  Other findings: non-specific ST-T wave changes    Clinical impression: abnormal EKG           Assessment and Plan     Diagnoses and all orders for this visit:    1. Atrial fibrillation and flutter (Primary)  Assessment & Plan:  Patient experienced a recent episode of atrial fibrillation.  EKG today shows sinus tachycardia with second-degree AV block, rate of 104 bpm.  A-fib episode  likely due to alcohol intake.  - Increase metoprolol succinate to 50 mg once daily  - 5-day Holter monitor to assess A-fib burden.  He is only taking aspirin 81 mg at this time, we will discuss if Eliquis is needed after reviewing result of monitor.    -Keep follow-up visit with Dr. Trujillo in 3 weeks.    Orders:  -     ECG 12 Lead  -     metoprolol succinate XL (TOPROL-XL) 50 MG 24 hr tablet; Take 1 tablet by mouth Daily.  Dispense: 30 tablet; Refill: 0  -      Holter Monitor - 72 Hour Up To 15 Days; Future        Recommendations: ER if symptoms increase and Report if any new/changing symptoms immediately          Follow Up  Return for Keep appointment with Dr Trujillo in January .  Patient was given instructions and counseling regarding his condition or for health maintenance advice. Please see specific information pulled into the AVS if appropriate.

## 2025-01-02 ENCOUNTER — TELEPHONE (OUTPATIENT)
Dept: CARDIOLOGY | Facility: CLINIC | Age: 65
End: 2025-01-02

## 2025-01-02 RX ORDER — LOSARTAN POTASSIUM 100 MG/1
100 TABLET ORAL DAILY
Qty: 90 TABLET | Refills: 0 | Status: SHIPPED | OUTPATIENT
Start: 2025-01-02

## 2025-01-02 NOTE — TELEPHONE ENCOUNTER
Caller: Dave Benitez    Relationship: Self    Best call back number: [phone]    What is the best time to reach you: ANY    Who are you requesting to speak with (clinical staff, provider,  specific staff member): CLINICAL       What was the call regarding: PATIENT IS CALLING TO INFORM US THAT THEY STILL HAVE AN IRREGULAR HEART BEAT, AS WELL AS ANY MOVEMENT SHOOTS UP THE HEART RATE, AND WISHES TO DISCUSS POSSIBLE SOLUTIONS.     Is it okay if the provider responds through [email or web address]: NO PLEASE CALL.

## 2025-01-08 ENCOUNTER — TELEPHONE (OUTPATIENT)
Dept: CARDIOLOGY | Facility: CLINIC | Age: 65
End: 2025-01-08
Payer: COMMERCIAL

## 2025-01-08 RX ORDER — METOPROLOL SUCCINATE 100 MG/1
100 TABLET, EXTENDED RELEASE ORAL DAILY
Qty: 90 TABLET | Refills: 0 | Status: SHIPPED | OUTPATIENT
Start: 2025-01-08

## 2025-01-08 NOTE — TELEPHONE ENCOUNTER
Patient is doing good on the 100mg dose and feeling better. He uses [facility] in [place].

## 2025-01-08 NOTE — TELEPHONE ENCOUNTER
Caller: EmmanuelDave Florencio    Relationship: Self    Best call back number: [phone]     Requested Prescriptions:   Requested Prescriptions      No prescriptions requested or ordered in this encounter      METOPROLOL SUCCINATE XL (TOPROL-XL) 100MG 1 TAB PO DAILY    Pharmacy where request should be sent:    [address] [phone] [facility] [phone] FX [[id number]]       Last office visit with prescribing clinician: 8/22/2024   Last telemedicine visit with prescribing clinician: Visit date not found   Next office visit with prescribing clinician: 2/5/2025     Additional details provided by patient: PT REPORTS SPEAKING TO DR. TEJADA ABOUT UPPING METOPROLOL SUCCINATE XL 50 MG TO 100MG DAILY. PER 12.30.24 NOTES, I SEE 50MG. PT REPORTS CURRENTLY TAKING 2 50MG TABS AT THE MOMENT [facility] HAS NOT RECEIVED 100MG SCRIPT FROM US. PLEASED ADVISE.     Does the patient have less than a 3 day supply:  [x] Yes  [] No    Would you like a call back once the refill request has been completed: [x] Yes [] No    If the office needs to give you a call back, can they leave a voicemail: [] Yes [] No    Homero De La Garza Rep   01/08/25 10:29 EST

## 2025-01-13 ENCOUNTER — TELEPHONE (OUTPATIENT)
Dept: CARDIOLOGY | Facility: CLINIC | Age: 65
End: 2025-01-13

## 2025-01-13 NOTE — TELEPHONE ENCOUNTER
He has 100% a fib on the monitor he wore, and it having some very high heart rates with it. I'm sure he is aware of that based on the last OV notes etc.     Keep his metoprolol dose the same, and let him know I am reaching out to Dr. Recio about alternative medications and ablation options.     Also if he is still feeling the afib please have someone send in flecainide 100mg BID and have him take three pills all at once, if that does not stop the a fib then he can take one BID after that.

## 2025-01-14 ENCOUNTER — TELEPHONE (OUTPATIENT)
Dept: CARDIOLOGY | Facility: CLINIC | Age: 65
End: 2025-01-14
Payer: COMMERCIAL

## 2025-01-14 DIAGNOSIS — I48.92 ATRIAL FIBRILLATION AND FLUTTER: Primary | ICD-10-CM

## 2025-01-14 DIAGNOSIS — I48.91 ATRIAL FIBRILLATION AND FLUTTER: Primary | ICD-10-CM

## 2025-01-14 RX ORDER — FLECAINIDE ACETATE 100 MG/1
100 TABLET ORAL 2 TIMES DAILY PRN
Qty: 60 TABLET | Refills: 1 | Status: SHIPPED | OUTPATIENT
Start: 2025-01-14

## 2025-01-14 NOTE — TELEPHONE ENCOUNTER
Spoke with pt.and relayed that monitor showed that pt. was in A-Fib 100% of time with high heart rates and that Dr Trujillo was reaching out to Dr Recio. Dr Trujillo had also mentioned leaving metoprolol dose the same and adding Flecainide 100 mg BID. Instructed that pt could  take 3 at once and if remained elevated to take 100 mg Flecainide BID. Pt has requested that the medication be called into [facility] pharmacy in [place] in the event that he should need it on a weekend when we are not available. He currently states that the increase in metoprolol has decreased his heart rate for now and is doing well.Pt inquired about eliquis and if he needed to start that back as he is only on an aspirin currently.

## 2025-01-14 NOTE — TELEPHONE ENCOUNTER
Called and spoke with patient. He relates that his HR is running much better now since the increase in metoprolol to 100 mg. Instructed that Dr Trujillo has reached out to Dr Recio and pt relates that he has an upcoming appointment with Dr Recio coming up in the next month(2-24-25). Pt verbalized understanding and no questions voiced at this time.

## 2025-01-15 ENCOUNTER — TELEPHONE (OUTPATIENT)
Dept: CARDIOLOGY | Facility: CLINIC | Age: 65
End: 2025-01-15

## 2025-01-15 NOTE — TELEPHONE ENCOUNTER
Caller: Dave Benitez    Relationship: Self    Best call back number: [phone]    What is the best time to reach you: ANY    Who are you requesting to speak with (clinical staff, provider,  specific staff member): CLINICAL    What was the call regarding: PATIENT CALLED BACK BECAUSE HE STILL HASN'T HEARD ANYTHING ABOUT WHETHER HE SHOULD BE GOING BACK ON ELIQUIS OR NOT. PLEASE CALL HIM BACK WHEN POSSIBLE TO DISCUSS FURTHER. THANK YOU    Is it okay if the provider responds through [employer]: PLEASE CALL

## 2025-01-15 NOTE — TELEPHONE ENCOUNTER
Spoke to patient. He is aware and understands medication changes and to stop aspirin. Pt has no additional questions.

## 2025-01-15 NOTE — TELEPHONE ENCOUNTER
Yes on Eliquis.  Sent into [facility].  Yes to flecainide as well.  Can stop aspirin when starting Eliquis.

## 2025-01-29 ENCOUNTER — OFFICE VISIT (OUTPATIENT)
Dept: FAMILY MEDICINE CLINIC | Facility: CLINIC | Age: 65
End: 2025-01-29
Payer: COMMERCIAL

## 2025-01-29 VITALS
HEART RATE: 76 BPM | WEIGHT: 271.2 LBS | HEIGHT: 74 IN | SYSTOLIC BLOOD PRESSURE: 128 MMHG | OXYGEN SATURATION: 96 % | BODY MASS INDEX: 34.8 KG/M2 | DIASTOLIC BLOOD PRESSURE: 82 MMHG

## 2025-01-29 DIAGNOSIS — I10 PRIMARY HYPERTENSION: ICD-10-CM

## 2025-01-29 DIAGNOSIS — K22.719 BARRETT'S ESOPHAGUS WITH DYSPLASIA: ICD-10-CM

## 2025-01-29 DIAGNOSIS — R73.03 PREDIABETES: Primary | ICD-10-CM

## 2025-01-29 DIAGNOSIS — M54.16 LUMBAR RADICULOPATHY: ICD-10-CM

## 2025-01-29 PROCEDURE — 99214 OFFICE O/P EST MOD 30 MIN: CPT | Performed by: INTERNAL MEDICINE

## 2025-01-29 NOTE — PROGRESS NOTES
Dave Benitez  1960  [id number]  Patient Care Team:  Vega Ash MD as PCP - General (Internal Medicine)  Shen Recio DO as Consulting Physician (Cardiology)  Bethanie Trujillo MD as Consulting Physician (Cardiology)  Andie Thrasher APRN as Nurse Practitioner (Nurse Practitioner)    Dave Benitez is a 64 y.o. male here today for follow up.     This patient is accompanied by their self who contributes to the history of their care.    Chief Complaint:    Chief Complaint   Patient presents with    Hypertension    Back Pain     Lower back    Tinnitus     Rt ear        History of Present Illness:  I have reviewed and/or updated the patient's past medical, past surgical, family, social history, problem list and allergies as appropriate.     Is here for follow-up of blood pressure.  His blood pressures have been controlled.  He is on metoprolol  mg daily HCTZ 25 mg daily and 100 mg daily.  Denies any orthostasis.  Does tend to watch his salt intake.  Remains on Eliquis for A-fib flutter.    He is complaining of ringing in his right ear as well as lower back pain. Predominantly left sided low back pain- 3 weeks ago it has radiated into posterior left leg causing his foot to tingle. Worse with walking and eases with bending. He does not drag left foot and no leg weakness. No saddle anesthesia. Does not have incontinence.     Still having spurts of afib- has been giving flecainide    He has developed tinnitus right side thumbing pulsatile. Only intermittent. Sees cardiology. Has only happened about 6 times denies ear pain. He does take floanse    Review of Systems   Constitutional:  Negative for chills, fatigue, fever, unexpected weight gain and unexpected weight loss.   HENT:  Negative for ear pain, postnasal drip, sinus pressure and sore throat.    Eyes:  Negative for blurred vision, double vision and visual disturbance.   Respiratory:  Negative for cough, shortness of breath and  "wheezing.    Cardiovascular:  Negative for chest pain, palpitations and leg swelling.   Gastrointestinal:  Negative for abdominal pain, blood in stool, diarrhea, nausea and vomiting.   Endocrine: Negative for cold intolerance, heat intolerance, polydipsia, polyphagia and polyuria.   Genitourinary:  Negative for dysuria, flank pain and hematuria.   Musculoskeletal:  Positive for back pain and myalgias. Negative for arthralgias and joint swelling.   Skin:  Negative for dry skin and rash.   Neurological:  Positive for numbness. Negative for weakness and headache.   Psychiatric/Behavioral:  Negative for self-injury, suicidal ideas and depressed mood.        Vitals:    01/29/25 1359   BP: 128/82   Pulse: 76   SpO2: 96%   Weight: 123 kg (271 lb 3.2 oz)   Height: 188 cm (74.02\")   PainSc:   5   PainLoc: Back     Body mass index is 34.8 kg/m².    Physical Exam  Vitals and nursing note reviewed.   Constitutional:       General: He is not in acute distress.     Appearance: He is well-developed. He is not diaphoretic.   HENT:      Head: Normocephalic and atraumatic.      Right Ear: External ear normal.      Left Ear: External ear normal.      Ears:      Comments: Bulging bilateral tympanic membranes with no air-fluid level     Mouth/Throat:      Pharynx: No oropharyngeal exudate.   Eyes:      General: No scleral icterus.        Right eye: No discharge.      Conjunctiva/sclera: Conjunctivae normal.   Neck:      Thyroid: No thyromegaly.      Vascular: No JVD.      Trachea: No tracheal deviation.   Cardiovascular:      Rate and Rhythm: Normal rate and regular rhythm.      Heart sounds: Normal heart sounds.      Comments: PMI nondisplaced  Pulmonary:      Effort: Pulmonary effort is normal.      Breath sounds: Normal breath sounds. No wheezing or rales.   Abdominal:      General: Bowel sounds are normal.      Palpations: Abdomen is soft.      Tenderness: There is no abdominal tenderness. There is no guarding or rebound. "   Musculoskeletal:      Cervical back: Normal range of motion and neck supple.      Right lower leg: No edema.      Left lower leg: No edema.      Comments: Right leg raise is negative.  CARLA ER negative.  Strength lower extremities 5 out of 5 hip flexor extensor as well as leg extensor and flexor.  No weakness with plantar or dorsiflexion.  Gait is normal no back tenderness.   Lymphadenopathy:      Cervical: No cervical adenopathy.   Skin:     General: Skin is warm and dry.      Capillary Refill: Capillary refill takes less than 2 seconds.      Coloration: Skin is not pale.      Findings: No rash.   Neurological:      Mental Status: He is alert and oriented to person, place, and time.      Motor: No abnormal muscle tone.      Coordination: Coordination normal.   Psychiatric:         Mood and Affect: Mood normal.         Behavior: Behavior normal.         Judgment: Judgment normal.         Procedures    Results Review:    None    Assessment/Plan:    Problem List Items Addressed This Visit       Hypertensive disorder    Current Assessment & Plan     Hypertension is stable and controlled  Continue current treatment regimen.  Dietary sodium restriction.  Weight loss.  Regular aerobic exercise.  Ambulatory blood pressure monitoring.  Blood pressure will be reassessed in 6 months.         Relevant Medications    hydroCHLOROthiazide 25 MG tablet    losartan (COZAAR) 100 MG tablet    metoprolol succinate XL (Toprol XL) 100 MG 24 hr tablet    Other Relevant Orders    Comprehensive Metabolic Panel    Coe's esophagus    Current Assessment & Plan     Will be due in about 2 years for repeat colonoscopy as well as EGD. This will need to be done in the hospital under general anesthesia based on his history. Continue Prilosec for the foreseeable future.          Relevant Medications    omeprazole (priLOSEC) 20 MG capsule    Other Relevant Orders    Ambulatory Referral to Gastroenterology    Prediabetes - Primary    Relevant  Orders    Hemoglobin A1c     Other Visit Diagnoses       Lumbar radiculopathy        Relevant Orders    Ambulatory Referral to Physical Therapy for Evaluation & Treatment (Completed)        Amended physical therapy for his lumbar radiculopathy.  Should symptoms worsen, he develops saddle anesthesia or or weakness he will notify the office for imaging studies.  If he fails to improve we will move forward with imaging studies.    Suspect his tinnitus is secondary to eustachian tube dysfunction encouraged ongoing Flonase use    Plan of care reviewed with patient at the conclusion of today's visit. Education was provided regarding diagnosis and management.  Patient verbalizes understanding of and agreement with management plan.    Return in about 6 months (around 7/29/2025) for Annual.    Vega Ash MD      Please note than portions of this note were completed [facility] a Voice Recognition Program

## 2025-01-30 ENCOUNTER — PREP FOR SURGERY (OUTPATIENT)
Dept: OTHER | Facility: HOSPITAL | Age: 65
End: 2025-01-30
Payer: COMMERCIAL

## 2025-01-30 DIAGNOSIS — K22.719 BARRETT'S ESOPHAGUS WITH DYSPLASIA: Primary | ICD-10-CM

## 2025-01-30 RX ORDER — SODIUM CHLORIDE, SODIUM LACTATE, POTASSIUM CHLORIDE, CALCIUM CHLORIDE 600; 310; 30; 20 MG/100ML; MG/100ML; MG/100ML; MG/100ML
30 INJECTION, SOLUTION INTRAVENOUS CONTINUOUS PRN
OUTPATIENT
Start: 2025-01-30 | End: 2025-01-30

## 2025-02-03 RX ORDER — LOSARTAN POTASSIUM 50 MG/1
50 TABLET ORAL DAILY
Qty: 90 TABLET | Refills: 0 | OUTPATIENT
Start: 2025-02-03

## 2025-02-03 NOTE — TELEPHONE ENCOUNTER
Rx Refill Note  Requested Prescriptions     Pending Prescriptions Disp Refills    losartan (COZAAR) 50 MG tablet [Pharmacy Med Name: Losartan Potassium 50 MG Oral Tablet] 90 tablet 0     Sig: Take 1 tablet by mouth daily      Last office visit with prescribing clinician: 1/29/2025   Last telemedicine visit with prescribing clinician: Visit date not found   Next office visit with prescribing clinician: Visit date not found                         Would you like a call back once the refill request has been completed: [] Yes [] No    If the office needs to give you a call back, can they leave a voicemail: [] Yes [] No    Lennie Corral MA  02/03/25, 12:54 EST

## 2025-02-05 ENCOUNTER — OFFICE VISIT (OUTPATIENT)
Dept: CARDIOLOGY | Facility: CLINIC | Age: 65
End: 2025-02-05
Payer: COMMERCIAL

## 2025-02-05 VITALS
DIASTOLIC BLOOD PRESSURE: 80 MMHG | WEIGHT: 266 LBS | HEIGHT: 74 IN | OXYGEN SATURATION: 96 % | BODY MASS INDEX: 34.14 KG/M2 | SYSTOLIC BLOOD PRESSURE: 137 MMHG | HEART RATE: 79 BPM

## 2025-02-05 DIAGNOSIS — I48.0 PAROXYSMAL ATRIAL FIBRILLATION: Primary | ICD-10-CM

## 2025-02-05 DIAGNOSIS — R55 SYNCOPE AND COLLAPSE: ICD-10-CM

## 2025-02-05 PROCEDURE — 99214 OFFICE O/P EST MOD 30 MIN: CPT | Performed by: INTERNAL MEDICINE

## 2025-02-05 PROCEDURE — 93000 ELECTROCARDIOGRAM COMPLETE: CPT | Performed by: INTERNAL MEDICINE

## 2025-02-05 RX ORDER — FLECAINIDE ACETATE 100 MG/1
100 TABLET ORAL 2 TIMES DAILY PRN
Start: 2025-02-05

## 2025-02-05 NOTE — PROGRESS NOTES
"    [facility] Provider Note     Date:   2025   Name: Dave Benietz  :   1960  PCP: Vega Ash MD    Chief Complaint   Patient presents with    Atrial Fibrillation     Pt states he is here today for follow up atrial fib.        Subjective     History of Present Illness  Dave Benitez is a 64 y.o. male who presents today for atrial fib  Pt reports that has had a change in metoprolol and that has made the biggest difference in rate and symptoms.  Reports that has not had to use Flecainide  No chest pain ,No palpitations  No shortness of breath or dizziness  Feeling good and drinking decaff tea    1. PAF - Redo EP study 2022 with Redo PVI, RFA of  left atrial roof, left atrial posterior wall, 2 separate SVTs atrial tachycardias near coronary sinus. s.p PVI re do with Dr. Recio    2. ANGELITO 23 baseline AHI 18  Current settings 8-18 cm    3. HTN    4. LHC  [facility]    5. Syncope episode 2024    Cardiac MRI 24 normal size left ventricular with minimally depressed global systolic function 49% no late gadolinium no luteum enhancement of the LV myocardium.  Normal size right ventricle with normal global systolic function RVEF 45%.  Pulmonary regurgitation with a small visualized jet.  Regurgitant fraction is estimated to be 20% based on functional analysis, however flow analysis was not performed.    24 RHC ·  Normal cardiac output  ·  Mildly elevated PA pressures and PCWP.  ·  No O2 saturation \"step up\".    24 carotid duplex Right ICA: Imaging indicates normal flow without evidence of hemodynamically significant stenosis.   · Right Vertebral: Antegrade flow is present.   · Right Subclavian: Imaging indicates patent flow.   · Left ICA: Imaging indicates <50% stenosis.   · Left Vertebral: Antegrade flow is present.   · Left Subclavian: Imaging indicates patent flow.     Echo 2024  Left ventricular systolic function is normal. Left " ventricular ejection fraction appears to be 61 - 65%.  ·  Left ventricular wall thickness is consistent with borderline septal asymmetric hypertrophy.  ·  Left ventricular diastolic function is consistent with (grade I) impaired relaxation.  ·  Estimated right ventricular systolic pressure from tricuspid regurgitation is normal (<35 mmHg).  ·  Mild dilation of the aortic root is present. Mild dilation of the sinuses of Valsalva is present. Mild dilation of the ascending aorta is present.  ·  Small left atrium with prominent coronary sinus.    Holter monitor 8/22/2024  A normal monitor study.  Some nonsustained arrhythmias. No pauses and no significant bradycardia. No reason for syncope seen.      Allergies   Allergen Reactions    Lisinopril Cough       Current Outpatient Medications:     apixaban (ELIQUIS) 5 MG tablet tablet, Take 1 tablet by mouth 2 (Two) Times a Day., Disp: 60 tablet, Rfl: 11    hydroCHLOROthiazide 25 MG tablet, Take 1 tablet by mouth once daily, Disp: 90 tablet, Rfl: 1    losartan (COZAAR) 100 MG tablet, Take 1 tablet by mouth once daily, Disp: 90 tablet, Rfl: 0    metoprolol succinate XL (Toprol XL) 100 MG 24 hr tablet, Take 1 tablet by mouth Daily., Disp: 90 tablet, Rfl: 0    omeprazole (priLOSEC) 20 MG capsule, Take 1 capsule by mouth Daily., Disp: 90 capsule, Rfl: 2    tadalafil (Cialis) 5 MG tablet, Take 1 tablet by mouth Daily. (Patient taking differently: Take 1 tablet by mouth Daily As Needed.), Disp: 30 tablet, Rfl: 3    flecainide (TAMBOCOR) 100 MG tablet, Take 1 tablet by mouth 2 (Two) Times a Day As Needed (If heart rate continues to be elevated at 100 or higher 1 hour AFTER Metoprolol). (Patient not taking: Reported on 2/5/2025), Disp: 60 tablet, Rfl: 1    Past Medical History:   Diagnosis Date    A-fib     Colon cancer     12 years ago    Colon polyp     Hypertension     Sleep apnea     baseline AHI 18      Past Surgical History:   Procedure Laterality Date    ABLATION OF  "DYSRHYTHMIC FOCUS N/A 11/07/2014    x3    CARDIAC CATHETERIZATION N/A 9/12/2024    Procedure: Right Heart Cath;  Surgeon: Jakub Rangel MD;  Location:  [facility];  Service: Cardiovascular;  Laterality: N/A;    CARDIAC ELECTROPHYSIOLOGY PROCEDURE N/A 11/17/2022    Procedure: Ablation atrial fibrillation +1 hour. Hold flecainide 3 days prior.;  Surgeon: Shen Recio DO;  Location:  [facility] EP INVASIVE LOCATION;  Service: Cardiovascular;  Laterality: N/A;    COLON RESECTION N/A     HERNIA REPAIR N/A     MENISCECTOMY       Family History   Problem Relation Age of Onset    Stroke Mother     Esophageal cancer Father      Social History     Socioeconomic History    Marital status:    Tobacco Use    Smoking status: Never     Passive exposure: Never    Smokeless tobacco: Former     Types: Chew     Quit date: 1982   Vaping Use    Vaping status: Never Used   Substance and Sexual Activity    Alcohol use: Yes     Comment: monthly    Drug use: Never    Sexual activity: Defer       Objective     Vital Signs:  /80 (BP Location: Left arm, Patient Position: Sitting, Cuff Size: Adult)   Pulse 79   Ht 188 cm (74.02\")   Wt 121 kg (266 lb)   SpO2 96%   BMI 34.13 kg/m²   Estimated body mass index is 34.13 kg/m² as calculated from the following:    Height as of this encounter: 188 cm (74.02\").    Weight as of this encounter: 121 kg (266 lb).         Physical Exam  Constitutional:       Appearance: Normal appearance. He is well-developed.   HENT:      Head: Normocephalic and atraumatic.   Eyes:      General: No scleral icterus.     Pupils: Pupils are equal, round, and reactive to light.   Cardiovascular:      Rate and Rhythm: Normal rate and regular rhythm.      Heart sounds: Normal heart sounds. No murmur heard.  Pulmonary:      Breath sounds: Normal breath sounds. No wheezing or rhonchi.   Musculoskeletal:      Right lower leg: No edema.      Left lower leg: No edema.   Skin:     Capillary Refill: " Capillary refill takes less than 2 seconds.      Coloration: Skin is not cyanotic.      Nails: There is no clubbing.   Neurological:      Mental Status: He is alert and oriented to person, place, and time.      Motor: No weakness.      Gait: Gait normal.   Psychiatric:         Mood and Affect: Mood normal.         Behavior: Behavior is cooperative.         Thought Content: Thought content normal.         Cognition and Memory: Memory normal.                 ECG 12 Lead    Date/Time: 2/5/2025 12:03 PM  Performed by: Bethanie Trujillo MD    Authorized by: Bethanie Trujillo MD  Comparison: compared with previous ECG from 12/30/2024  Similar to previous ECG  Rhythm: sinus rhythm  Rate: normal  Conduction: conduction normal  QRS axis: normal  Other findings: non-specific ST-T wave changes    Clinical impression: abnormal EKG  Comments: Atrial tachycardia/flutter from last EKG replaced with normal sinus rhythm.          Assessment and Plan     Assessment & Plan  Paroxysmal atrial fibrillation  Has done well with increase in metoprolol.  Continue Eliquis.  Unfortunately has had several ablations with break through  afterwards.  Discussed with him that I wonder if he may be a candidate for surgical maze in the future.  At this time since his medication is controlling his symptoms we will continue as is.  Refill flecainide for as needed use  Orders:    flecainide (TAMBOCOR) 100 MG tablet; Take 1 tablet by mouth 2 (Two) Times a Day As Needed (If heart rate continues to be elevated at 100 or higher 1 hour AFTER Metoprolol).    Syncope and collapse  No further episodes of syncope.  Workup was fairly unrevealing.  Some mild ventricular tachycardia noted on most recent monitor.  However this was not sustained in EP lab and we have gone up on beta-blocker recently.  Will continue to watch this.       Other orders    ECG 12 Lead            Follow Up  No follow-ups on file.    LORRAINE Trujillo MD  Cardiology and Sleep [facility]  Health  02/05/2025     Please note that this explicitly excludes time spent on other separate billable services such as performing procedures or test interpretation, when applicable.    This note was created using dictation software which occasionally transcribes nonsensical phrases. Please contact the provider if any clarification is needed.

## 2025-02-24 ENCOUNTER — OFFICE VISIT (OUTPATIENT)
Dept: CARDIOLOGY | Facility: CLINIC | Age: 65
End: 2025-02-24
Payer: COMMERCIAL

## 2025-02-24 ENCOUNTER — PATIENT ROUNDING (BHMG ONLY) (OUTPATIENT)
Dept: CARDIOLOGY | Facility: CLINIC | Age: 65
End: 2025-02-24
Payer: COMMERCIAL

## 2025-02-24 VITALS
HEIGHT: 74 IN | SYSTOLIC BLOOD PRESSURE: 118 MMHG | HEART RATE: 67 BPM | BODY MASS INDEX: 34.24 KG/M2 | OXYGEN SATURATION: 95 % | DIASTOLIC BLOOD PRESSURE: 64 MMHG | WEIGHT: 266.8 LBS

## 2025-02-24 DIAGNOSIS — R55 SYNCOPE AND COLLAPSE: ICD-10-CM

## 2025-02-24 DIAGNOSIS — I10 ESSENTIAL HYPERTENSION: ICD-10-CM

## 2025-02-24 DIAGNOSIS — I48.19 PERSISTENT ATRIAL FIBRILLATION: Primary | ICD-10-CM

## 2025-02-24 DIAGNOSIS — G47.33 OSA (OBSTRUCTIVE SLEEP APNEA): ICD-10-CM

## 2025-02-24 PROCEDURE — 93000 ELECTROCARDIOGRAM COMPLETE: CPT | Performed by: NURSE PRACTITIONER

## 2025-02-24 PROCEDURE — 99214 OFFICE O/P EST MOD 30 MIN: CPT | Performed by: NURSE PRACTITIONER

## 2025-02-24 NOTE — PROGRESS NOTES
"                   Cardiac Electrophysiology Outpatient Follow Up Note            [facility]    Follow Up Office Visit      Dave Benitez  [id number]  02/24/2025  [unfilled]  [unfilled]    Primary Care Physician: Vega Ash MD    Referred By: No ref. provider found    Subjective     CC: Atrial fibrillation    Problem List:   Atrial fibrillation/flutter  12/30/2024 Holter monitor (pt wore monitor 4 days 23 hours, 56 minutes): 100% A-fib, maximal heart rate 200 bpm, minimum 65 bpm average 92 bpm.  No pauses some nonsustained VT noted  9/5/2024 Holter monitor (pt wore monitor 6 days, 22 hours, 29 minutes)  A normal monitor study. Some nonsustained arrhythmias. No pauses and no significant bradycardia. No reason for syncope seen.  10/14/2022 PVI ablation \"Adjunctive ablation of the left atrial roof for persistent atrial fibrillation Adjunctive ablation of left atrial posterior wall for persistent atrial fibrillation Catheter ablation of SVT #1, reentrant focal atrial tachycardia from proximal portion of coronary sinus with a cycle length of 270 ms Catheter ablation of SVT #2 reentrant focal atrial tachycardia from proximal portion of coronary sinus with a cycle length of 288 ms  Chronically anticoagulated with Eliquis  Hypertension  ANGELITO  CPAP complaint  Syncope   9/13/2024 Cardiac MRI: Normal sized left ventricle with minimally depressed global systolic function (LVEF 49%). No late gadolinium enhancement of the LV myocardium. Normal sized right ventricle with normal global systolic function (RVEF 45%). RV end-systolic volume index is elevated. Pulmonary regurgitation with a small visualized jet. Regurgitant fraction is estimated to be 20% based on functional analysis, however flow analysis was not performed.   9/12/24 RHC: Normal cardiac output.  Mildly elevated PA pressures and PCWP.  No oxygen saturation \"step up\"  9/5/2024 Carotid duplex:  Right internal carotid " artery demonstrates normal flow without evidence of hemodynamically significant stenosis. Left internal carotid artery demonstrates a less than 50% stenosis. Normal bilateral vertebral artery doppler flow.  9/5/2024 TTE:  Left ventricular systolic function is normal. Left ventricular ejection fraction appears to be 61 - 65%. Left ventricular wall thickness is consistent with borderline septal asymmetric hypertrophy. Left ventricular diastolic function is consistent with (grade I) impaired relaxation. Estimated right ventricular systolic pressure from tricuspid regurgitation is normal (<35 mmHg). Mild dilation of the aortic root is present. Mild dilation of the sinuses of Valsalva is present. Mild dilation of the ascending aorta is present. Small left atrium with prominent coronary sinus.      History of Present Illness:   Dave Benitez is a 64 y.o. male who presents to my electrophysiology clinic for follow up of atrial fibrillation.  Since the patient's last visit he has been undergoing workup by his primary cardiologist Dr. Trujillo for syncope.  He is underwent transthoracic echocardiogram which noted normal LV function, LV wall thickness with borderline asymmetrical hypertrophy, mild tricuspid regurgitation and mild dilation of the aortic root.  Carotid duplex was unremarkable.  Right heart catheterization was unremarkable.  Patient underwent cardiac MRI noting an LVEF of 49% and pulmonary regurgitation.  Subsequently Holter monitor wore on 12/30/2024 noting the patient had has been in atrial fibrillation 100% of the time  maximal heart rate 200 bpm, minimum 65 bpm average 92 bpm.  No pauses some nonsustained VT. He is chronically anticoagulated with Eliquis    He states his issues atrial fibrillation around the holidays when he drank three glasses of wine. He states eating a big meal or becoming constipated tends to trigger his atrial fibrillation. In his workup with his increased pulmonary pressures.   His Metoprolol was increased with improvement of his palpitations.  When in atrial fibrillation the patient has palpitations, fatigued, exertional dyspnea. He has not required Flecainide for breakthrough atrial fibrillation. Today his EKG notes NSR with PAC.      As far as the patient's syncope, this was when he was not in atrial fibrillation.  It was thought that this was related to dehydration.     Past Medical History:   Diagnosis Date    A-fib     Colon cancer     12 years ago    Colon polyp     Hypertension     Sleep apnea     baseline AHI 18       Past Surgical History:   Procedure Laterality Date    ABLATION OF DYSRHYTHMIC FOCUS N/A 11/07/2014    x3    CARDIAC CATHETERIZATION N/A 9/12/2024    Procedure: Right Heart Cath;  Surgeon: Jakub Rangel MD;  Location:  [facility];  Service: Cardiovascular;  Laterality: N/A;    CARDIAC ELECTROPHYSIOLOGY PROCEDURE N/A 11/17/2022    Procedure: Ablation atrial fibrillation +1 hour. Hold flecainide 3 days prior.;  Surgeon: Shen Recio DO;  Location:  [facility] EP INVASIVE LOC[facility];  Service: Cardiovascular;  Laterality: N/A;    COLON RESECTION N/A     HERNIA REPAIR N/A     MENISCECTOMY         Family History   Problem Relation Age of Onset    Stroke Mother     Esophageal cancer Father        Social History     Socioeconomic History    Marital status:    Tobacco Use    Smoking status: Never     Passive exposure: Never    Smokeless tobacco: Former     Types: Chew     Quit date: 1982   Vaping Use    Vaping status: Never Used   Substance and Sexual Activity    Alcohol use: Yes     Comment: monthly    Drug use: Never    Sexual activity: Defer         Current Outpatient Medications:     apixaban (ELIQUIS) 5 MG tablet tablet, Take 1 tablet by mouth 2 (Two) Times a Day., Disp: 60 tablet, Rfl: 11    hydroCHLOROthiazide 25 MG tablet, Take 1 tablet by mouth once daily, Disp: 90 tablet, Rfl: 1    losartan (COZAAR) 100 MG tablet, Take 1 tablet by mouth once  "daily, Disp: 90 tablet, Rfl: 0    metoprolol succinate XL (Toprol XL) 100 MG 24 hr tablet, Take 1 tablet by mouth Daily., Disp: 90 tablet, Rfl: 0    omeprazole (priLOSEC) 20 MG capsule, Take 1 capsule by mouth Daily., Disp: 90 capsule, Rfl: 2    tadalafil (Cialis) 5 MG tablet, Take 1 tablet by mouth Daily. (Patient taking differently: Take 1 tablet by mouth Daily As Needed.), Disp: 30 tablet, Rfl: 3    flecainide (TAMBOCOR) 100 MG tablet, Take 1 tablet by mouth 2 (Two) Times a Day As Needed (If heart rate continues to be elevated at 100 or higher 1 hour AFTER Metoprolol). (Patient not taking: Reported on 2/24/2025), Disp: , Rfl:     Allergies   Allergen Reactions    Lisinopril Cough       Objective     Vitals:    02/24/25 0852   BP: 118/64   BP Location: Left arm   Patient Position: Sitting   Cuff Size: Adult   Pulse: 67   SpO2: 95%   Weight: 121 kg (266 lb 12.8 oz)   Height: 188 cm (74\")     Body mass index is 34.26 kg/m².    Constitutional:       Appearance: Normal and healthy appearance. Not in distress.   HENT:      Head: Normocephalic and atraumatic.   Pulmonary:      Effort: Pulmonary effort is normal.      Breath sounds: Normal breath sounds.   Cardiovascular:      PMI at left midclavicular line. Normal rate. Regular rhythm. Normal S1. Normal S2.       Murmurs: There is no murmur.      No gallop.  No click. No rub.   Pulses:     Intact distal pulses.   Edema:     Peripheral edema absent.   Abdominal:      General: Bowel sounds are normal.      Palpations: Abdomen is soft.   Skin:     General: Skin is warm and dry.   Neurological:      Mental Status: Alert and oriented to person, place and time.   Psychiatric:         Behavior: Behavior is cooperative.         Lab Results   Component Value Date    GLUCOSE 111 (H) 09/12/2024    CALCIUM 9.2 09/12/2024     09/12/2024    K 3.9 09/12/2024    CO2 27.0 09/12/2024     09/12/2024    BUN 11 09/12/2024    CREATININE 0.80 09/12/2024    BCR 11.8 09/12/2024    " "ANIONGAP 10.0 09/12/2024     Lab Results   Component Value Date    WBC 5.49 09/12/2024    HGB 13.5 09/12/2024    HCT 39.1 09/12/2024    MCV 96.5 09/12/2024     09/12/2024     No results found for: \"INR\", \"PROTIME\"  Lab Results   Component Value Date    TSH 1.240 08/19/2024       Cardiac Testing:     I personally viewed and interpreted the patient's EKG/Telemetry/lab data.      ECG 12 Lead    Date/Time: 2/24/2025 9:43 AM  Performed by: Kasia Gomez APRN    Authorized by: Kasia Gomez APRN  Rhythm: sinus rhythm  Ectopy: infrequent PVCs  Rate: normal          Tobacco Cessation: N/A  Obstructive Sleep Apnea Screening: Completed    Advance Care Planning   ACP discussion was declined by the patient. Patient has an advance directive in EMR which is still valid.        Assessment & Plan      Assessment & Plan  Persistent atrial fibrillation  CHADsVASc stroke risk stratification   Anticoagulated with Eliquis 5mg PO BID  Continue BB therapy at current dose         Syncope and collapse  No further episodes, workup essential unremarkable  Workup thought to be due dehydration         Essential hypertension  Hypertension is stable and controlled  Continue current treatment regimen.  Blood pressure will be reassessed in 1 year.         ANGELITO (obstructive sleep apnea)  Complaint with CPAP             Follow Up:   Return in about 3 months (around 5/24/2025).    Thank you for allowing me to participate in the care of your patient. Please to not hesitate to contact me with additional questions or concerns.      LANE Arora  [facility] / [facility]  "

## 2025-02-24 NOTE — PROGRESS NOTES
"February 24, 2025    Hello, may I speak with Dave Benitez? Yes.    My name is Olya MULLEN      I am  with [facility]  [address]  [place] [address] [phone].    Before we get started may I verify your date of birth? 1960, Yes, correct.    I am calling to officially welcome you to our practice and ask about your recent visit. Is this a good time to talk? yes    Tell me about your visit with us. What things went well?  Everything; everyone is very friendly/no complaints, no wait times, no multiple questions.  Out of a 10 would give you an 11.  You are all \"on the ball\"; the entire/whole [facility] experience has been over the top 100% satisfied. (\"Don't ever go to , the Dr's are ok, but, the people are all college students and still need to grow in many ways!\")       We're always looking for ways to make our patients' experiences even better. Do you have recommendations on ways we may improve?  no  (Wife stated, I know, \"make a change to [address].\").  No, doing a good job no improving needing.\"    Overall were you satisfied with your first visit to our practice? yes       I appreciate you taking the time to speak with me today. Is there anything else I can do for you? no      Thank you, and have a great day.      "

## 2025-03-12 DIAGNOSIS — I48.0 PAROXYSMAL ATRIAL FIBRILLATION: ICD-10-CM

## 2025-03-12 RX ORDER — FLECAINIDE ACETATE 100 MG/1
TABLET ORAL
Qty: 60 TABLET | Refills: 6 | Status: SHIPPED | OUTPATIENT
Start: 2025-03-12

## 2025-03-19 ENCOUNTER — TELEPHONE (OUTPATIENT)
Dept: FAMILY MEDICINE CLINIC | Facility: CLINIC | Age: 65
End: 2025-03-19
Payer: COMMERCIAL

## 2025-03-19 NOTE — TELEPHONE ENCOUNTER
"Provider: MD JOEL    Caller: Dave Benitez    Relationship to Patient: Self    Phone Number: [phone]     Reason for Call: PATIENT WOULD LIKE TO KNOW WHO PCP RECOMMENDS FOR CATARACT SURGERY - \"WHO PCP WOULD SEND HIS FAMILY TO\"    "

## 2025-03-24 ENCOUNTER — TELEPHONE (OUTPATIENT)
Dept: CARDIOLOGY | Facility: CLINIC | Age: 65
End: 2025-03-24

## 2025-03-24 NOTE — TELEPHONE ENCOUNTER
Caller: Dave Benitez    Relationship to patient: Self    Best call back number: [phone] (home)     Chief complaint: NONE    Type of visit: F/U    Requested date:  LAST WEEK OF APRIL 2025 BUT NOT 4.26 OR 4.30    If rescheduling, when is the original appointment: 5.5.25    Additional notes: NO SCHEDULING TIMEFRAME IN CHART FOR HUB TO SCHEDULE EARLIER APPT.

## 2025-03-31 RX ORDER — LOSARTAN POTASSIUM 100 MG/1
100 TABLET ORAL DAILY
Qty: 90 TABLET | Refills: 1 | Status: SHIPPED | OUTPATIENT
Start: 2025-03-31

## 2025-04-01 RX ORDER — METOPROLOL SUCCINATE 100 MG/1
100 TABLET, EXTENDED RELEASE ORAL DAILY
Qty: 90 TABLET | Refills: 0 | Status: SHIPPED | OUTPATIENT
Start: 2025-04-01

## 2025-04-23 ENCOUNTER — PRE-ADMISSION TESTING (OUTPATIENT)
Dept: PREADMISSION TESTING | Facility: HOSPITAL | Age: 65
End: 2025-04-23
Payer: COMMERCIAL

## 2025-04-23 ENCOUNTER — TELEPHONE (OUTPATIENT)
Dept: CARDIOLOGY | Facility: CLINIC | Age: 65
End: 2025-04-23
Payer: COMMERCIAL

## 2025-04-23 VITALS — BODY MASS INDEX: 35.49 KG/M2 | HEIGHT: 73 IN | WEIGHT: 267.75 LBS

## 2025-04-23 LAB
DEPRECATED RDW RBC AUTO: 42.2 FL (ref 37–54)
ERYTHROCYTE [DISTWIDTH] IN BLOOD BY AUTOMATED COUNT: 12.2 % (ref 12.3–15.4)
HCT VFR BLD AUTO: 40.1 % (ref 37.5–51)
HGB BLD-MCNC: 13.8 G/DL (ref 13–17.7)
INR PPP: 1.04 (ref 0.89–1.12)
MCH RBC QN AUTO: 32.3 PG (ref 26.6–33)
MCHC RBC AUTO-ENTMCNC: 34.4 G/DL (ref 31.5–35.7)
MCV RBC AUTO: 93.9 FL (ref 79–97)
PLATELET # BLD AUTO: 222 10*3/MM3 (ref 140–450)
PMV BLD AUTO: 8.8 FL (ref 6–12)
POTASSIUM SERPL-SCNC: 4.2 MMOL/L (ref 3.5–5.2)
PROTHROMBIN TIME: 14.2 SECONDS (ref 12.2–15.3)
RBC # BLD AUTO: 4.27 10*6/MM3 (ref 4.14–5.8)
WBC NRBC COR # BLD AUTO: 5.93 10*3/MM3 (ref 3.4–10.8)

## 2025-04-23 PROCEDURE — 85027 COMPLETE CBC AUTOMATED: CPT

## 2025-04-23 PROCEDURE — 36415 COLL VENOUS BLD VENIPUNCTURE: CPT

## 2025-04-23 PROCEDURE — 84132 ASSAY OF SERUM POTASSIUM: CPT

## 2025-04-23 PROCEDURE — 85610 PROTHROMBIN TIME: CPT

## 2025-04-23 NOTE — LETTER
..    [facility] CARDIOLOGY [place] [address]  [facility] [address]  Phone: [phone]  Fax: [phone]    Cardiac Risk Assessment Review    Date: 25    Patient Name: Dave Benitez  Patient :   1960    Surgical Procedure:      Procedure/Test Performed Date   [x] EKG 25- NSR   [x] Echocardiogram 2024- EF 61-65%   [] Stress Test    [x] Heart Cath - normal coronary arteries      Based on the above test results and/or clinical evaluation, it is my recommendation:    [x]  Patient has an acceptable cardiac risk for the procedure as planned. Limitations of risk assessments have been reviewed with the patient.    [x] Patient CAN stop Eliquis 2 days prior to the procedure.    [x] The patient has obstructive sleep apnea and/or central sleep apnea, and appropriate anesthesia precautions should be taken.     [x] Beta blockers should not be held in the perioperative period.      If you have any questions, please call our office at [phone].    Thank you,  LANE Martini   [facility] [place]

## 2025-04-23 NOTE — TELEPHONE ENCOUNTER
..Any new symptoms since last OV such as chest pain SOA? NO  Any worsening of edema or worsening palpitations? NO  Any major medical issues since last OV we need to know? NO  Is the patient on Eliquis, xarelto, pradaxa? ELIQUIS  Is the patient on aspirin? NO  Is the patient on brilinta (ticagrelor), plavix (clopidogrel), prasugrel (effient)? NO  Is the patient on medications like mounjaro or ozempic? NO  Last EKG? 02/24/25  Last stress test? MARINA 06/08/22  Last echo? 09/05/24  Last heart cath or CCTA? CATH 09/12/24  Last OV? 02/05/25     CANDIDO AT  PRE ADMISSION TESTING CALLED OFFICE. PATIENT IS NEEDING CLEARANCE TO HOLD ELIQUIS 2 DAYS PRIOR TO EGD THAT IS SCHEDULED ON 04/30/25. HE HAS A FOLLOW UP SCHEDULED HERE 4/28/25 BUT NEEDS TO KNOW IF HE IS OK TO HOLD ELIQUIS THAT DAY DUE TO SURGERY 2 DAYS LATER.

## 2025-04-23 NOTE — DISCHARGE INSTRUCTIONS
The following information and instructions were given:    Do not eat, drink, smoke or chew gum after midnight the night before surgery. This includes no mints.  Take all routine, prescribed medications including heart and blood pressure medicines with a sip of water unless otherwise instructed by your physician.   Do NOT take diabetic medication unless instructed by your physician.    DO NOT shave for two days before your procedure.  Do not wear makeup.      DO NOT wear fingernail polish (gel/regular) and/or acrylic/artificial nails on the day of surgery.   If you had a recent manicure and would rather not remove polish or artificial nails, the minimum requirement is that the polish/artificial nails must be removed from the middle finger on each hand.      Remove all jewelry (advise to go to jeweler if unable to remove).  Jewelry, especially rings, can no longer be taped for surgery.    Leave anything you consider valuable at home.    Bring the following with you the day of your procedure (when applicable):       -Picture ID and insurance cards       -Co-pay/deductible required by insurance       -Medications in the original bottles or detailed list (must include name of medication, dosage, and frequency).  This includes all over-the-counter medications if not brought to [facility]       -Copy of advance directive, living will or power of  documents if not brought to PAT       -PAT Pass    Educational handout related to procedure given to patient.  Educational handout also includes general information related to their recovery that mentions signs and symptoms of infection and when to call the doctor.    Patient must a  the day of the procedure and the  must remain in the Endoscopy   Waiting room throughout the procedure.

## 2025-04-23 NOTE — PAT
An arrival time for procedure was not provided during PAT visit. If patient had any questions or concerns about their arrival time, they were instructed to contact their surgeon/physician.  Additionally, if the patient referred to an arrival time that was acquired from their my chart account, patient was encouraged to verify that time with their surgeon/physician. Arrival times are NOT provided in Pre Admission Testing Department.    Patient viewed general PAT education video as instructed in their preoperative information received from their surgeon.  Patient stated the general PAT education video was viewed in its entirety and survey completed.  Copies of PAT general education handouts (Incentive Spirometry, Meds to Beds Program, Patient Belongings, Pre-op skin preparation instructions, Blood Glucose testing, Visitor policy, Surgery FAQ, Code H) distributed to patient if not printed. Education related to the PAT pass and skin preparation for surgery (if applicable) completed in PAT as a reinforcement to PAT education video. Patient instructed to return PAT pass provided today as well as completed skin preparation sheet (if applicable) on the day of procedure.     Additionally if patient had not viewed video yet but intended to view it at home or in our waiting area, then referred them to the handout with QR code/link provided during PAT visit.  Encouraged patient/family to read PAT general education handouts thoroughly and notify PAT staff with any questions or concerns. Patient verbalized understanding of all information and priority content.    Per Anesthesia Request, patient instructed not to take their ACE/ARB medications on the AM of surgery.    Patient denies any current skin issues.     EKG AND OFFICE NOTE IN CHART FROM 2/24/25, OFFICE CALLED TO REQUEST CARDIAC AND BLOOD THINNER CLEARANCE.  CARDIAC CLEARANCE TO BE COMPLETED DURING NEXT VISIT ON 4/28/205, OFFICE TO CONTACT WITH INSTRUCTIONS FOR ELIQUIS.

## 2025-04-25 NOTE — PAT
"[facility] department called and wanted someone from [facility] to call the patient's wife.  The wife called their department and left a message asking \"if the records from [place] have been received.\"    I attempted to call the patient's wife x2.  No answer.     It should be noted that [facility] in [place] requested cardiac clearance from the patient's cardiologist.  We have received that and placed it on the chart.  So maybe the patient was asking the cardiology records vs [place] records.   "

## 2025-04-28 ENCOUNTER — OFFICE VISIT (OUTPATIENT)
Dept: CARDIOLOGY | Facility: CLINIC | Age: 65
End: 2025-04-28
Payer: COMMERCIAL

## 2025-04-28 VITALS
SYSTOLIC BLOOD PRESSURE: 120 MMHG | HEIGHT: 73 IN | OXYGEN SATURATION: 96 % | HEART RATE: 81 BPM | WEIGHT: 267 LBS | DIASTOLIC BLOOD PRESSURE: 70 MMHG | BODY MASS INDEX: 35.39 KG/M2

## 2025-04-28 DIAGNOSIS — I10 ESSENTIAL HYPERTENSION: ICD-10-CM

## 2025-04-28 DIAGNOSIS — J32.0 CHRONIC MAXILLARY SINUSITIS: ICD-10-CM

## 2025-04-28 DIAGNOSIS — G47.33 OSA (OBSTRUCTIVE SLEEP APNEA): ICD-10-CM

## 2025-04-28 DIAGNOSIS — R55 SYNCOPE AND COLLAPSE: Primary | ICD-10-CM

## 2025-04-28 DIAGNOSIS — I48.0 PAROXYSMAL ATRIAL FIBRILLATION: ICD-10-CM

## 2025-04-28 PROCEDURE — 99214 OFFICE O/P EST MOD 30 MIN: CPT | Performed by: INTERNAL MEDICINE

## 2025-04-28 RX ORDER — MONTELUKAST SODIUM 10 MG/1
10 TABLET ORAL NIGHTLY
Qty: 30 TABLET | Refills: 11 | Status: SHIPPED | OUTPATIENT
Start: 2025-04-28

## 2025-04-28 RX ORDER — MONTELUKAST SODIUM 4 MG/1
4 TABLET, CHEWABLE ORAL NIGHTLY
Qty: 90 TABLET | Refills: 1 | Status: SHIPPED | OUTPATIENT
Start: 2025-04-28 | End: 2025-04-28

## 2025-04-28 NOTE — PROGRESS NOTES
Cardiovascular and Sleep Consulting Provider Note     Date:   2025   Name: Dave Benitez  :   1960  PCP: Vega Ash MD    Chief Complaint   Patient presents with    Atrial Fibrillation     Pt states he is here today for follow up atrial fib. No chest pain or SOA. He states ever so often his heart rate does up to 100 but goes back down.     Sleep Apnea     Pt states he is here today for follow up ANGELITO. He is doing well on current therapy. DL is in Epic.        Subjective     History of Present Illness  Dave Benitez is a 65 y.o. male with PAF, ANGELITO who presents today for follow up.   Has no complaints.  No chest pain or palpitations.  No shortness of breath.  Has concerns with mouth being dry through night. Wearing C-Pap well and wears the full face mask due to nasal problems.  Has occasional high heart rates but has sat down and they have went away and not needed the flecainide. Reports that as long as he eats small amounts at a time will not get chest pressure. If he was to eat a full meal would bloat and belch and have gas and that all caused pressure.  Wondering if we can request the records from allergy testing and see if they can find what allergic to. The reports came back nothing.  B/P has been doing well.    1. PAF - Redo EP study 2022 with Redo PVI, RFA of  left atrial roof, left atrial posterior wall, 2 separate SVTs atrial tachycardias near coronary sinus. s.p PVI re do with Dr. Recio    2. ANGELITO 23 baseline AHI 18  Current settings 8-18 cm    3. HTN    4. LHC  [facility]    5. Syncope episode 2024    Cardiac MRI 24 normal size left ventricular with minimally depressed global systolic function 49% no late gadolinium no luteum enhancement of the LV myocardium.  Normal size right ventricle with normal global systolic function RVEF 45%.  Pulmonary regurgitation with a small visualized jet.  Regurgitant fraction is estimated to be 20% based on functional  "analysis, however flow analysis was not performed.    9/12/24 RHC ·  Normal cardiac output  ·  Mildly elevated PA pressures and PCWP.  ·  No O2 saturation \"step up\".    9/5/24 carotid duplex Right ICA: Imaging indicates normal flow without evidence of hemodynamically significant stenosis.   · Right Vertebral: Antegrade flow is present.   · Right Subclavian: Imaging indicates patent flow.   · Left ICA: Imaging indicates <50% stenosis.   · Left Vertebral: Antegrade flow is present.   · Left Subclavian: Imaging indicates patent flow.     Echo 9/5/2024  Left ventricular systolic function is normal. Left ventricular ejection fraction appears to be 61 - 65%.  ·  Left ventricular wall thickness is consistent with borderline septal asymmetric hypertrophy.  ·  Left ventricular diastolic function is consistent with (grade I) impaired relaxation.  ·  Estimated right ventricular systolic pressure from tricuspid regurgitation is normal (<35 mmHg).  ·  Mild dilation of the aortic root is present. Mild dilation of the sinuses of Valsalva is present. Mild dilation of the ascending aorta is present.  ·  Small left atrium with prominent coronary sinus.    Holter monitor 8/22/2024  A normal monitor study.  Some nonsustained arrhythmias. No pauses and no significant bradycardia. No reason for syncope seen.      Allergies   Allergen Reactions    Lisinopril Cough       Current Outpatient Medications:     apixaban (ELIQUIS) 5 MG tablet tablet, Take 1 tablet by mouth 2 (Two) Times a Day., Disp: 60 tablet, Rfl: 11    hydroCHLOROthiazide 25 MG tablet, Take 1 tablet by mouth once daily, Disp: 90 tablet, Rfl: 1    losartan (COZAAR) 100 MG tablet, Take 1 tablet by mouth once daily, Disp: 90 tablet, Rfl: 1    metoprolol succinate XL (TOPROL-XL) 100 MG 24 hr tablet, Take 1 tablet by mouth once daily, Disp: 90 tablet, Rfl: 0    omeprazole (priLOSEC) 20 MG capsule, Take 1 capsule by mouth Daily., Disp: 90 capsule, Rfl: 2    tadalafil (Cialis) 5 MG " tablet, Take 1 tablet by mouth Daily. (Patient taking differently: Take 1 tablet by mouth Daily As Needed.), Disp: 30 tablet, Rfl: 3    flecainide (TAMBOCOR) 100 MG tablet, TAKE 1 TABLET BY MOUTH TWICE DAILY AS NEEDED IF HEART RATE CONTINUES TO BE ELAVATED  OR HIGHER 1 HOUR AFTER METOPROLOL (Patient not taking: Reported on 4/28/2025), Disp: 60 tablet, Rfl: 6    montelukast (Singulair) 10 MG tablet, Take 1 tablet by mouth Every Night., Disp: 30 tablet, Rfl: 11    Past Medical History:   Diagnosis Date    A-fib     Coe's esophagus     Colon cancer     12 years ago    Colon polyp     GERD (gastroesophageal reflux disease)     Hypertension     Sleep apnea     baseline AHI 18      Past Surgical History:   Procedure Laterality Date    ABLATION OF DYSRHYTHMIC FOCUS N/A 11/07/2014    x3    AXILLARY LYMPH NODE BIOPSY/EXCISION      CARDIAC CATHETERIZATION N/A 09/12/2024    Procedure: Right Heart Cath;  Surgeon: Jakub Rangel MD;  Location:  [facility];  Service: Cardiovascular;  Laterality: N/A;    CARDIAC ELECTROPHYSIOLOGY PROCEDURE N/A 11/17/2022    Procedure: Ablation atrial fibrillation +1 hour. Hold flecainide 3 days prior.;  Surgeon: Shen Recio DO;  Location:  [facility];  Service: Cardiovascular;  Laterality: N/A;    COLON RESECTION N/A     COLONOSCOPY      ENDOSCOPY      HERNIA REPAIR N/A     MENISCECTOMY Left      Family History   Problem Relation Age of Onset    Stroke Mother     Esophageal cancer Father      Social History     Socioeconomic History    Marital status:    Tobacco Use    Smoking status: Never     Passive exposure: Never    Smokeless tobacco: Former     Types: Chew     Quit date: 1982   Vaping Use    Vaping status: Never Used   Substance and Sexual Activity    Alcohol use: Yes     Comment: monthly    Drug use: Never    Sexual activity: Defer       Objective     Vital Signs:  /70 (BP Location: Right arm, Patient Position: Sitting, Cuff  "Size: Adult)   Pulse 81   Ht 185.4 cm (73\")   Wt 121 kg (267 lb)   SpO2 96%   BMI 35.23 kg/m²   Estimated body mass index is 35.23 kg/m² as calculated from the following:    Height as of this encounter: 185.4 cm (73\").    Weight as of this encounter: 121 kg (267 lb).         Physical Exam  Constitutional:       Appearance: Normal appearance. He is well-developed.   HENT:      Head: Normocephalic and atraumatic.   Eyes:      General: No scleral icterus.     Pupils: Pupils are equal, round, and reactive to light.   Cardiovascular:      Rate and Rhythm: Normal rate and regular rhythm.      Heart sounds: Normal heart sounds. No murmur heard.  Pulmonary:      Breath sounds: Normal breath sounds. No wheezing or rhonchi.   Musculoskeletal:      Right lower leg: No edema.      Left lower leg: No edema.   Skin:     Capillary Refill: Capillary refill takes less than 2 seconds.      Coloration: Skin is not cyanotic.      Nails: There is no clubbing.   Neurological:      Mental Status: He is alert and oriented to person, place, and time.      Motor: No weakness.      Gait: Gait normal.   Psychiatric:         Mood and Affect: Mood normal.         Behavior: Behavior is cooperative.         Thought Content: Thought content normal.                     Assessment and Plan     ASSESSMENTS AND ORDERS  Diagnoses and all orders for this visit:    1. Syncope and collapse (Primary)    2. Paroxysmal atrial fibrillation    3. Essential hypertension    4. ANGELITO (obstructive sleep apnea)    5. Chronic maxillary sinusitis  -     Discontinue: montelukast (Singulair) 4 MG chewable tablet; Chew 1 tablet Every Night.  Dispense: 90 tablet; Refill: 1  -     montelukast (Singulair) 10 MG tablet; Take 1 tablet by mouth Every Night.  Dispense: 30 tablet; Refill: 11             PLAN  -Htn well controlled, continue current meds  -CPAP doing well, good compliance and control  -no a fib recently, doing well  -looking fgor ENT notes, and going to try " singulair              Follow Up  No follow-ups on file.    LORRAINE Trujillo MD  Cardiology and [facility]  04/28/2025     Please note that this explicitly excludes time spent on other separate billable services such as performing procedures or test interpretation, when applicable.    This note was created using dictation software which occasionally transcribes nonsensical phrases. Please contact the provider if any clarification is needed.

## 2025-04-29 ENCOUNTER — ANESTHESIA EVENT (OUTPATIENT)
Dept: GASTROENTEROLOGY | Facility: HOSPITAL | Age: 65
End: 2025-04-29
Payer: COMMERCIAL

## 2025-04-29 NOTE — ANESTHESIA PREPROCEDURE EVALUATION
" Anesthesia Evaluation     Patient summary reviewed and Nursing notes reviewed   NPO Solid Status: > 8 hours  NPO Liquid Status: > 2 hours           Airway   Mallampati: II  TM distance: >3 FB  No difficulty expected  Dental - normal exam     Pulmonary     breath sounds clear to auscultation  (+) a smoker Former,sleep apnea on CPAP  Cardiovascular   Exercise tolerance: good (4-7 METS)    ECG reviewed  PT is on anticoagulation therapy  Rhythm: irregular  Rate: normal    (+) hypertension well controlled 2 medications or greater, dysrhythmias Atrial Fib, Atrial Flutter    ROS comment: NORMAL EF    Neuro/Psych  (+) syncope (secondary hypotension)  GI/Hepatic/Renal/Endo    (+) obesity, GERD (Coe's esophagus) well controlled, diabetes mellitus (\"prediabetes\")    Musculoskeletal     Abdominal   (+) obese    Abdomen: soft.   Substance History      OB/GYN          Other   arthritis,   history of cancer (colon cancer)                  Anesthesia Plan    ASA 3     general     (propofol)  intravenous induction     Anesthetic plan, risks, benefits, and alternatives have been provided, discussed and informed consent has been obtained with: patient.    Plan discussed with CRNA.      CODE STATUS:       "

## 2025-04-30 ENCOUNTER — HOSPITAL ENCOUNTER (OUTPATIENT)
Facility: HOSPITAL | Age: 65
Setting detail: HOSPITAL OUTPATIENT SURGERY
Discharge: HOME | End: 2025-04-30
Attending: INTERNAL MEDICINE | Admitting: INTERNAL MEDICINE
Payer: COMMERCIAL

## 2025-04-30 ENCOUNTER — ANESTHESIA (OUTPATIENT)
Dept: GASTROENTEROLOGY | Facility: HOSPITAL | Age: 65
End: 2025-04-30
Payer: COMMERCIAL

## 2025-04-30 VITALS
SYSTOLIC BLOOD PRESSURE: 114 MMHG | TEMPERATURE: 97.8 F | WEIGHT: 266.76 LBS | BODY MASS INDEX: 35.35 KG/M2 | RESPIRATION RATE: 20 BRPM | HEIGHT: 73 IN | OXYGEN SATURATION: 97 % | HEART RATE: 65 BPM | DIASTOLIC BLOOD PRESSURE: 80 MMHG

## 2025-04-30 DIAGNOSIS — K22.719 BARRETT'S ESOPHAGUS WITH DYSPLASIA: ICD-10-CM

## 2025-04-30 LAB
BASE EXCESS BLDA CALC-SCNC: -1 MMOL/L (ref -5–5)
CA-I BLDA-SCNC: 1.21 MMOL/L (ref 1.2–1.32)
CO2 BLDA-SCNC: 25 MMOL/L (ref 24–29)
GLUCOSE BLDC GLUCOMTR-MCNC: 102 MG/DL (ref 70–130)
HCO3 BLDA-SCNC: 24 MMOL/L (ref 22–26)
HCT VFR BLDA CALC: 42 % (ref 38–51)
HGB BLDA-MCNC: 14.3 G/DL (ref 12–17)
PCO2 BLDA: 37.2 MM HG (ref 35–45)
PH BLDA: 7.42 PH UNITS (ref 7.35–7.6)
PO2 BLDA: 47 MMHG (ref 80–105)
POTASSIUM BLDA-SCNC: 3.4 MMOL/L (ref 3.5–4.9)
QT INTERVAL: 410 MS
QTC INTERVAL: 470 MS
SAO2 % BLDA: 84 % (ref 95–98)
SODIUM BLD-SCNC: 138 MMOL/L (ref 138–146)

## 2025-04-30 PROCEDURE — 25810000003 SODIUM CHLORIDE 0.9 % SOLUTION

## 2025-04-30 PROCEDURE — 84132 ASSAY OF SERUM POTASSIUM: CPT

## 2025-04-30 PROCEDURE — 25810000003 SODIUM CHLORIDE 0.9 % SOLUTION: Performed by: ANESTHESIOLOGY

## 2025-04-30 PROCEDURE — 93010 ELECTROCARDIOGRAM REPORT: CPT | Performed by: STUDENT IN AN ORGANIZED HEALTH CARE EDUCATION/TRAINING PROGRAM

## 2025-04-30 PROCEDURE — 84295 ASSAY OF SERUM SODIUM: CPT

## 2025-04-30 PROCEDURE — 82947 ASSAY GLUCOSE BLOOD QUANT: CPT

## 2025-04-30 PROCEDURE — 25010000002 PROPOFOL 10 MG/ML EMULSION

## 2025-04-30 PROCEDURE — 88305 TISSUE EXAM BY PATHOLOGIST: CPT | Performed by: INTERNAL MEDICINE

## 2025-04-30 PROCEDURE — 93005 ELECTROCARDIOGRAM TRACING: CPT | Performed by: ANESTHESIOLOGY

## 2025-04-30 PROCEDURE — 43239 EGD BIOPSY SINGLE/MULTIPLE: CPT | Performed by: INTERNAL MEDICINE

## 2025-04-30 PROCEDURE — 85014 HEMATOCRIT: CPT

## 2025-04-30 PROCEDURE — 82803 BLOOD GASES ANY COMBINATION: CPT

## 2025-04-30 PROCEDURE — 82330 ASSAY OF CALCIUM: CPT

## 2025-04-30 RX ORDER — SODIUM CHLORIDE, SODIUM LACTATE, POTASSIUM CHLORIDE, CALCIUM CHLORIDE 600; 310; 30; 20 MG/100ML; MG/100ML; MG/100ML; MG/100ML
9 INJECTION, SOLUTION INTRAVENOUS CONTINUOUS
Status: DISCONTINUED | OUTPATIENT
Start: 2025-05-01 | End: 2025-04-30 | Stop reason: HOSPADM

## 2025-04-30 RX ORDER — SODIUM CHLORIDE 9 MG/ML
INJECTION, SOLUTION INTRAVENOUS CONTINUOUS PRN
Status: DISCONTINUED | OUTPATIENT
Start: 2025-04-30 | End: 2025-04-30 | Stop reason: SURG

## 2025-04-30 RX ORDER — SODIUM CHLORIDE 0.9 % (FLUSH) 0.9 %
10 SYRINGE (ML) INJECTION EVERY 12 HOURS SCHEDULED
Status: DISCONTINUED | OUTPATIENT
Start: 2025-04-30 | End: 2025-04-30 | Stop reason: HOSPADM

## 2025-04-30 RX ORDER — ONDANSETRON 2 MG/ML
4 INJECTION INTRAMUSCULAR; INTRAVENOUS ONCE AS NEEDED
Status: DISCONTINUED | OUTPATIENT
Start: 2025-04-30 | End: 2025-04-30 | Stop reason: SDUPTHER

## 2025-04-30 RX ORDER — MIDAZOLAM HYDROCHLORIDE 1 MG/ML
1 INJECTION, SOLUTION INTRAMUSCULAR; INTRAVENOUS
Status: DISCONTINUED | OUTPATIENT
Start: 2025-04-30 | End: 2025-04-30 | Stop reason: HOSPADM

## 2025-04-30 RX ORDER — ONDANSETRON 2 MG/ML
4 INJECTION INTRAMUSCULAR; INTRAVENOUS ONCE AS NEEDED
Status: DISCONTINUED | OUTPATIENT
Start: 2025-04-30 | End: 2025-04-30 | Stop reason: HOSPADM

## 2025-04-30 RX ORDER — IPRATROPIUM BROMIDE AND ALBUTEROL SULFATE 2.5; .5 MG/3ML; MG/3ML
3 SOLUTION RESPIRATORY (INHALATION) ONCE AS NEEDED
Status: DISCONTINUED | OUTPATIENT
Start: 2025-04-30 | End: 2025-04-30 | Stop reason: SDUPTHER

## 2025-04-30 RX ORDER — SODIUM CHLORIDE 9 MG/ML
50 INJECTION, SOLUTION INTRAVENOUS CONTINUOUS
Status: DISCONTINUED | OUTPATIENT
Start: 2025-04-30 | End: 2025-04-30 | Stop reason: HOSPADM

## 2025-04-30 RX ORDER — PROPOFOL 10 MG/ML
VIAL (ML) INTRAVENOUS AS NEEDED
Status: DISCONTINUED | OUTPATIENT
Start: 2025-04-30 | End: 2025-04-30 | Stop reason: SURG

## 2025-04-30 RX ORDER — IPRATROPIUM BROMIDE AND ALBUTEROL SULFATE 2.5; .5 MG/3ML; MG/3ML
3 SOLUTION RESPIRATORY (INHALATION) ONCE AS NEEDED
Status: DISCONTINUED | OUTPATIENT
Start: 2025-04-30 | End: 2025-04-30 | Stop reason: HOSPADM

## 2025-04-30 RX ORDER — LIDOCAINE HYDROCHLORIDE 10 MG/ML
0.5 INJECTION, SOLUTION EPIDURAL; INFILTRATION; INTRACAUDAL; PERINEURAL ONCE AS NEEDED
Status: DISCONTINUED | OUTPATIENT
Start: 2025-04-30 | End: 2025-04-30 | Stop reason: HOSPADM

## 2025-04-30 RX ORDER — FAMOTIDINE 20 MG/1
20 TABLET, FILM COATED ORAL ONCE
Status: DISCONTINUED | OUTPATIENT
Start: 2025-04-30 | End: 2025-04-30 | Stop reason: HOSPADM

## 2025-04-30 RX ORDER — SODIUM CHLORIDE 0.9 % (FLUSH) 0.9 %
10 SYRINGE (ML) INJECTION AS NEEDED
Status: DISCONTINUED | OUTPATIENT
Start: 2025-04-30 | End: 2025-04-30 | Stop reason: HOSPADM

## 2025-04-30 RX ORDER — FAMOTIDINE 10 MG/ML
20 INJECTION, SOLUTION INTRAVENOUS ONCE
Status: DISCONTINUED | OUTPATIENT
Start: 2025-04-30 | End: 2025-04-30 | Stop reason: HOSPADM

## 2025-04-30 RX ADMIN — PROPOFOL 50 MG: 10 INJECTION, EMULSION INTRAVENOUS at 15:59

## 2025-04-30 RX ADMIN — PROPOFOL 50 MG: 10 INJECTION, EMULSION INTRAVENOUS at 16:01

## 2025-04-30 RX ADMIN — PROPOFOL 50 MG: 10 INJECTION, EMULSION INTRAVENOUS at 16:05

## 2025-04-30 RX ADMIN — SODIUM CHLORIDE: 9 INJECTION, SOLUTION INTRAVENOUS at 15:56

## 2025-04-30 RX ADMIN — SODIUM CHLORIDE 50 ML/HR: 9 INJECTION, SOLUTION INTRAVENOUS at 14:15

## 2025-04-30 RX ADMIN — PROPOFOL 50 MG: 10 INJECTION, EMULSION INTRAVENOUS at 16:06

## 2025-04-30 RX ADMIN — PROPOFOL 50 MG: 10 INJECTION, EMULSION INTRAVENOUS at 16:03

## 2025-04-30 NOTE — H&P
Gastroenterology Consult Note    Reason for Consultation: Coe's esophagus    Patient Care Team:  Vega Ash MD as PCP - General (Internal Medicine)  Shen Recio DO as Consulting Physician (Cardiology)  Bethanie Trujillo MD as Consulting Physician (Cardiology)  Andie Thrasher APRN as Nurse Practitioner (Nurse Practitioner)  Kasia Gomez APRN as Nurse Practitioner (Cardiology)    Chief complaint: Surveillance for Coe's esophagus      History of present illness: The patient is a 65-year-old gentleman with a history of Coe's esophagus.  [place]  His last upper endoscopy was 3 years ago.  He is here for upper endoscopy.  He has no dysphagia.  His reflux is well-controlled on his omeprazole.  His father  of esophageal cancer.    He also has a history of colon cancer and a polyp.  He had a resection.  That was 12 years ago.  He follows regularly from that standpoint.  His last colonoscopy was 3 years ago.  He is on a 5-year plan.  He is not a smoker or alcohol drinker.  He does take Eliquis for atrial fibrillation.    Allergies   Allergen Reactions    Lisinopril Cough     Prior to Admission medications    Medication Sig Start Date End Date Taking? Authorizing Provider   hydroCHLOROthiazide 25 MG tablet Take 1 tablet by mouth once daily 24  Yes Rachael Wheeler APRN   losartan (COZAAR) 100 MG tablet Take 1 tablet by mouth once daily 3/31/25  Yes Shen Recio DO   metoprolol succinate XL (TOPROL-XL) 100 MG 24 hr tablet Take 1 tablet by mouth once daily 25  Yes Rachael Wheeler APRN   omeprazole (priLOSEC) 20 MG capsule Take 1 capsule by mouth Daily. 3/26/24  Yes Vega Ash MD   apixaban (ELIQUIS) 5 MG tablet tablet Take 1 tablet by mouth 2 (Two) Times a Day. 1/15/25   Bethanie Trujillo MD   flecainide (TAMBOCOR) 100 MG tablet TAKE 1 TABLET BY MOUTH TWICE DAILY AS NEEDED IF HEART RATE CONTINUES TO BE ELAVATED  OR  HIGHER 1 HOUR AFTER METOPROLOL  Patient not taking: No sig reported 3/12/25   Rachael Wheeler APRN   montelukast (Singulair) 10 MG tablet Take 1 tablet by mouth Every Night. 4/28/25   Bethanie Trujillo MD   tadalafil (Cialis) 5 MG tablet Take 1 tablet by mouth Daily.  Patient taking differently: Take 1 tablet by mouth Daily As Needed. 3/26/24   Vega Ash MD      Current Facility-Administered Medications   Medication Dose Route Frequency Provider Last Rate Last Admin    famotidine (PEPCID) injection 20 mg  20 mg Intravenous Once Kiera Campbell MD        famotidine (PEPCID) tablet 20 mg  20 mg Oral Once Kiera Campbell MD        ipratropium-albuterol (DUO-NEB) nebulizer solution 3 mL  3 mL Nebulization Once PRN Cara Vernon CRNA        [START ON 5/1/2025] lactated ringers infusion  9 mL/hr Intravenous Continuous Kiera Campbell MD        lidocaine PF 1% (XYLOCAINE) injection 0.5 mL  0.5 mL Injection Once PRN Kiera Campbell MD        midazolam (VERSED) injection 1 mg  1 mg Intravenous Q10 Min PRN Kiera Campbell MD        ondansetron (ZOFRAN) injection 4 mg  4 mg Intravenous Once PRN Cara Vernon CRNA        sodium chloride 0.9 % flush 10 mL  10 mL Intravenous Q12H Kiera Campbell MD        sodium chloride 0.9 % flush 10 mL  10 mL Intravenous PRN Kiera Campbell MD        sodium chloride 0.9 % infusion  50 mL/hr Intravenous Continuous Kiera Campbell MD 50 mL/hr at 04/30/25 1415 50 mL/hr at 04/30/25 1415      Past Medical History:   Diagnosis Date    A-fib     Coe's esophagus     Colon cancer     12 years ago    Colon polyp     GERD (gastroesophageal reflux disease)     Hypertension     Sleep apnea     baseline AHI 18     Past Surgical History:   Procedure Laterality Date    ABLATION OF DYSRHYTHMIC FOCUS N/A 11/07/2014    x3    AXILLARY LYMPH NODE BIOPSY/EXCISION      CARDIAC CATHETERIZATION N/A 09/12/2024    Procedure: Right Heart Cath;   Surgeon: Jakub Rangel MD;  Location:  [facility];  Service: Cardiovascular;  Laterality: N/A;    CARDIAC ELECTROPHYSIOLOGY PROCEDURE N/A 11/17/2022    Procedure: Ablation atrial fibrillation +1 hour. Hold flecainide 3 days prior.;  Surgeon: Shen Recio DO;  Location:  [facility];  Service: Cardiovascular;  Laterality: N/A;    COLON RESECTION N/A     COLONOSCOPY      ENDOSCOPY      HERNIA REPAIR N/A     MENISCECTOMY Left      Family History   Problem Relation Age of Onset    Stroke Mother     Esophageal cancer Father      GI Family History  No family history of colon polyps or cancers  Social History     Tobacco Use   Smoking Status Never    Passive exposure: Never   Smokeless Tobacco Former    Types: Chew    Quit date: 1982     Social History     Substance and Sexual Activity   Alcohol Use Yes    Comment: monthly      Social History     Substance and Sexual Activity   Drug Use Never        ------  Review of systems:   Please refer to review of systems and pertinent positives in the history of present illness, all other groups tested are negative.    Vital Signs   Temp:  [98.3 °F (36.8 °C)] 98.3 °F (36.8 °C)  Heart Rate:  [66] 66  Resp:  [22] 22  BP: (125)/(75) 125/75    Physical Exam:   General Appearance: Alert, in no acute distress  Head: Normocephalic, without obvious abnormality, atraumatic  Eyes: Lids and lashes normal, conjunctivae and sclerae normal, no icterus, no pallor, corneas clear, PERRLA  Ears: Ears appear intact with no abnormalities noted  Neck: No adenopathy, supple, trachea midline, no thyromegaly, no JVD  Lungs: respirations regular, even and unlabored Heart: Regular rhythm and normal rate  Chest Wall: Symmetrical respiratory expansion  Abdomen: No masses, no organomegaly, soft nontender, nondistended, no guarding, no rebound tenderness  Extremities:  Moves all extremities well, no edema, no cyanosis, no redness  Skin: No bleeding, bruising or  rash  Neurologic: Cranial nerves 2 - 12 grossly intact, no focal deficits       LABS:   Lab Results (last 48 hours)       Procedure Component Value Units Date/Time    POC Surgery Labs [[id number]]  (Abnormal) Collected: 04/30/25 1415    Specimen: Blood Updated: 04/30/25 1544     Ionized Calcium 1.21 mmol/L      POC Potassium 3.4 mmol/L      Sodium 138 mmol/L      Total CO2 25 mmol/L      Hemoglobin 14.3 g/dL      Hematocrit 42 %      pCO2, Arterial 37.2 mm Hg      pO2, Arterial 47 mmHg      Comment: Serial Number: [id number]:  [id number]        Base Excess -1.0000 mmol/L      O2 Saturation, Arterial 84 %      pH, Arterial 7.42 pH units      HCO3, Arterial 24.0 mmol/L      Glucose 102 mg/dL           RADIOLOGY:  Imaging Results (Last 24 Hours)       ** No results found for the last 24 hours. **            Assessment & Plan       Coe's esophagus      Impressions and plan #1 Coe's esophagus: We will plan an upper endoscopy and rebiopsy.  If we find intestinal metaplasia we will send off for tissuecipher as well.        I discussed the patient's findings and my recommendations with patient and family    Denny Hyatt MD  04/30/25  15:57 EDT

## 2025-04-30 NOTE — ANESTHESIA POSTPROCEDURE EVALUATION
Patient: Dave Benitez    Procedure Summary       Date: 04/30/25 Room / Location:  [facility]    Anesthesia Start: 1556 Anesthesia Stop: 1614    Procedure: ESOPHAGOGASTRODUODENOSCOPY Diagnosis:       Coe's esophagus with dysplasia      (Coe's esophagus with dysplasia [K22.719])    Surgeons: Denny Hyatt MD Provider: Kevin Reyes MD    Anesthesia Type: general ASA Status: 3            Anesthesia Type: general    Vitals  Vitals Value Taken Time   /67 04/30/25 16:14   Temp 97.8 °F (36.6 °C) 04/30/25 16:14   Pulse 65 04/30/25 16:14   Resp 16 04/30/25 16:14   SpO2 96 % 04/30/25 16:14           Post Anesthesia Care and Evaluation    Patient location during evaluation: PACU  Patient participation: complete - patient participated  Level of consciousness: awake and alert  Pain management: adequate    Airway patency: patent  Anesthetic complications: No anesthetic complications  PONV Status: none  Cardiovascular status: hemodynamically stable and acceptable  Respiratory status: nonlabored ventilation, acceptable and face mask  Hydration status: acceptable

## 2025-04-30 NOTE — OP NOTE
EGD with biopsy    Instrument: Adult video gastroscope      Medications: As per anesthesia    Preop diagnosis: Coe's esophagus    Postop diagnosis: Short segment Coe's esophagus      Indications: The patient is a 65-year-old gentleman with a history of Coe's esophagus.  His last upper endoscopy was 3 years ago.  He is controlled with omeprazole.  He has no dysphagia.  His father had esophageal cancer.      Procedure: After the patient was informed of the risks, benefits, alternatives to the procedure, informed consent was obtained.  The endoscope was inserted in the oropharynx, down the esophagus, into the stomach and duodenum.  The esophageal mucosa was abnormal distally.  At 42 cm there were tongues of salmon-colored tissue consistent with short segment Coe's esophagus.  It appeared to be about 2 cm in length.  There was a small hiatal hernia.  Biopsies were taken and 4 quadrants.  The stomach was fairly unremarkable.  There was some very mild gastritis.  The duodenal bulb and descending duodenum were free of abnormalities.  Retroflexion was performed and photos were taken.  The patient tolerated the procedure well and there was no  bleeding.      Impressions and plan #1 Short segment Coe's esophagus    #2 hiatal hernia    #3 mild gastritis    Plan continue acid suppression and will follow-up biopsies.  I may check a Tissue cipher to help us further assess risk.   liquor

## 2025-05-01 ENCOUNTER — TELEPHONE (OUTPATIENT)
Dept: CARDIOLOGY | Facility: CLINIC | Age: 65
End: 2025-05-01
Payer: COMMERCIAL

## 2025-05-01 NOTE — TELEPHONE ENCOUNTER
"Caller: Dave Benitez \"Florencio\"    Relationship: Self    Best call back number: [phone]     What is the best time to reach you: ANY     Who are you requesting to speak with (clinical staff, provider,  specific staff member): DR. TEJADA NURSE OR LUÍS SHERMAN     What was the call regarding: LOOKING TO SPEAK WITH NURSE     HAD PROCEDURE DONE ON 4/30, ON [employer] THE RESULTS STATES HE HAD A LOW BLOOD OX LEVEL, THIS WAS 84%. PT IS LOOKING TO DISCUSS THIS AND OTHER TEST WITH OFFICE.     Is it okay if the provider responds through [facility]: CALL       "

## 2025-05-01 NOTE — TELEPHONE ENCOUNTER
PATIENT IS JUST WONDERING IF HE SHOULD BE WORRIED ABOUT THIS, HE STATES HE HAS NO SYMPTOMS AND IS FEELING FINE. HE JUST WANTED TO DOUBLE CHECK. PLEASE ADVISE.

## 2025-05-02 LAB
CYTO UR: NORMAL
LAB AP CASE REPORT: NORMAL
LAB AP CLINICAL INFORMATION: NORMAL
PATH REPORT.FINAL DX SPEC: NORMAL
PATH REPORT.GROSS SPEC: NORMAL

## 2025-05-02 NOTE — TELEPHONE ENCOUNTER
Reviewed, I think this is from where he has sleep apnea, but they cannot put a CPAP on him while they are doing an EGD at the same time. So he had some transient low oxygen. Looks like it recovered fine. Nothing to be concerned about in my opinion, just verification that he should wear his pap machine at home.

## 2025-05-02 NOTE — TELEPHONE ENCOUNTER
Called and spoke to Florencio and relayed that Dr Trujillo had reviewed and feels as though it was because they could not place the C-Pap on during the procedure. Verifying pt's need for C-Pap machine. Pt verbalized that he always wears at home unless he just falls asleep in the recliner. Verbalized understanding with no questions voiced.

## 2025-05-06 ENCOUNTER — TELEPHONE (OUTPATIENT)
Dept: CARDIOLOGY | Facility: CLINIC | Age: 65
End: 2025-05-06

## 2025-05-06 DIAGNOSIS — I48.92 ATRIAL FIBRILLATION AND FLUTTER: ICD-10-CM

## 2025-05-06 DIAGNOSIS — I48.91 ATRIAL FIBRILLATION AND FLUTTER: ICD-10-CM

## 2025-05-06 NOTE — TELEPHONE ENCOUNTER
PT CAME IN TO GET SAMPLES OF ELIQUIS, PT STATED HE ISN'T TAKING THE SINGULAR DR. TEJADA PRESCRIBED ON HIS LAST VISIT DUE TO ALL THE SIDE EFFECTS. THANKS

## 2025-06-23 ENCOUNTER — TELEPHONE (OUTPATIENT)
Dept: CARDIOLOGY | Facility: CLINIC | Age: 65
End: 2025-06-23
Payer: COMMERCIAL

## 2025-06-23 RX ORDER — HYDROCHLOROTHIAZIDE 25 MG/1
25 TABLET ORAL DAILY
Qty: 90 TABLET | Refills: 0 | Status: SHIPPED | OUTPATIENT
Start: 2025-06-23

## 2025-06-23 NOTE — TELEPHONE ENCOUNTER
Called pt back to check his status from previous phone call.  He relates his BP now is 150/86 with HR of 92.  He is unsure if still in a fib.  Per ROBER Trujillo, if pt still in afib, will plan cardioversion.  Pt states he will call back after recheck on his phone to see if still reading a fib.  He verbalizes understanding.

## 2025-06-23 NOTE — TELEPHONE ENCOUNTER
As long as heart rate is below 110 no treatment is needed acutely for A-fib.  So he can remain at home safely until office hours and stay in A-fib for days to weeks as long as heart rate is controlled.  I would not recommend redosing the flecainide again tonight if he goes back into it.  Would only do 300 mg 1 time in a day.  I sent this to him in my chart.  Just call and make sure he got it in the morning.

## 2025-06-23 NOTE — TELEPHONE ENCOUNTER
Pt has called in today and reports that he has been having palpitations and is reading A-Fib. He took 3 Flecainide as he was instructed to do about 25 minutes ago. Instructed pt for worsening of symptoms to go to ER. Also instructed to call my direct line back for further instructions from Dr Trujillo if continues to stay in A-Fib. Verbalized understanding.

## 2025-06-23 NOTE — TELEPHONE ENCOUNTER
Pt called back and is currently back in normal sinus rhythm.  Heart rate is 83.  He will call  to the end of the day to relate his status or if he notes he goes back in to a fib today.  Reminded pt to stay hyrated.  Will move OV up pending his call back.  He verbalizes understanding.

## 2025-06-23 NOTE — TELEPHONE ENCOUNTER
Called pt back this afternoon to check his status.  Staying indoors under AC and hydrated. Overall, he has been feeling well.  States he has checked his heart rate with NSR. Once it showed unrecognizable rhythm, but recheck NSR.  His question is that he took 300mg of Flecainide this morning bc a fib and rapid rate.  If symptoms should recur after hours or when clinic is closed, what measure should he take?  He does not have follow up until 10/29/25.  Please advise.

## 2025-06-25 ENCOUNTER — TELEPHONE (OUTPATIENT)
Dept: CARDIOLOGY | Facility: CLINIC | Age: 65
End: 2025-06-25
Payer: COMMERCIAL

## 2025-06-25 ENCOUNTER — HOSPITAL ENCOUNTER (OUTPATIENT)
Facility: HOSPITAL | Age: 65
Discharge: HOME | End: 2025-06-25
Payer: COMMERCIAL

## 2025-06-25 ENCOUNTER — LAB (OUTPATIENT)
Facility: HOSPITAL | Age: 65
End: 2025-06-25
Payer: COMMERCIAL

## 2025-06-25 DIAGNOSIS — I48.0 AF (PAROXYSMAL ATRIAL FIBRILLATION): ICD-10-CM

## 2025-06-25 DIAGNOSIS — R06.02 SHORTNESS OF BREATH: ICD-10-CM

## 2025-06-25 DIAGNOSIS — I48.0 AF (PAROXYSMAL ATRIAL FIBRILLATION): Primary | ICD-10-CM

## 2025-06-25 LAB
ALBUMIN SERPL-MCNC: 4.2 G/DL (ref 3.5–5.2)
ALBUMIN/GLOB SERPL: 1.2 G/DL
ALP SERPL-CCNC: 98 U/L (ref 39–117)
ALT SERPL W P-5'-P-CCNC: 15 U/L (ref 1–41)
ANION GAP SERPL CALCULATED.3IONS-SCNC: 11 MMOL/L (ref 5–15)
AST SERPL-CCNC: 22 U/L (ref 1–40)
BASOPHILS # BLD AUTO: 0.03 10*3/MM3 (ref 0–0.2)
BASOPHILS NFR BLD AUTO: 0.5 % (ref 0–1.5)
BILIRUB SERPL-MCNC: 0.8 MG/DL (ref 0–1.2)
BUN SERPL-MCNC: 10 MG/DL (ref 8–23)
BUN/CREAT SERPL: 10.1 (ref 7–25)
CALCIUM SPEC-SCNC: 9.7 MG/DL (ref 8.6–10.5)
CHLORIDE SERPL-SCNC: 98 MMOL/L (ref 98–107)
CO2 SERPL-SCNC: 26 MMOL/L (ref 22–29)
CREAT SERPL-MCNC: 0.99 MG/DL (ref 0.76–1.27)
DEPRECATED RDW RBC AUTO: 40.4 FL (ref 37–54)
EGFRCR SERPLBLD CKD-EPI 2021: 85.1 ML/MIN/1.73
EOSINOPHIL # BLD AUTO: 0.08 10*3/MM3 (ref 0–0.4)
EOSINOPHIL NFR BLD AUTO: 1.2 % (ref 0.3–6.2)
ERYTHROCYTE [DISTWIDTH] IN BLOOD BY AUTOMATED COUNT: 11.7 % (ref 12.3–15.4)
GLOBULIN UR ELPH-MCNC: 3.5 GM/DL
GLUCOSE SERPL-MCNC: 102 MG/DL (ref 65–99)
HCT VFR BLD AUTO: 41.6 % (ref 37.5–51)
HGB BLD-MCNC: 14.4 G/DL (ref 13–17.7)
IMM GRANULOCYTES # BLD AUTO: 0.03 10*3/MM3 (ref 0–0.05)
IMM GRANULOCYTES NFR BLD AUTO: 0.5 % (ref 0–0.5)
LYMPHOCYTES # BLD AUTO: 1.44 10*3/MM3 (ref 0.7–3.1)
LYMPHOCYTES NFR BLD AUTO: 21.9 % (ref 19.6–45.3)
MCH RBC QN AUTO: 33 PG (ref 26.6–33)
MCHC RBC AUTO-ENTMCNC: 34.6 G/DL (ref 31.5–35.7)
MCV RBC AUTO: 95.2 FL (ref 79–97)
MONOCYTES # BLD AUTO: 0.74 10*3/MM3 (ref 0.1–0.9)
MONOCYTES NFR BLD AUTO: 11.2 % (ref 5–12)
NEUTROPHILS NFR BLD AUTO: 4.27 10*3/MM3 (ref 1.7–7)
NEUTROPHILS NFR BLD AUTO: 64.7 % (ref 42.7–76)
NRBC BLD AUTO-RTO: 0 /100 WBC (ref 0–0.2)
NT-PROBNP SERPL-MCNC: <36 PG/ML (ref 0–900)
PLATELET # BLD AUTO: 269 10*3/MM3 (ref 140–450)
PMV BLD AUTO: 9.2 FL (ref 6–12)
POTASSIUM SERPL-SCNC: 3.8 MMOL/L (ref 3.5–5.2)
PROT SERPL-MCNC: 7.7 G/DL (ref 6–8.5)
RBC # BLD AUTO: 4.37 10*6/MM3 (ref 4.14–5.8)
SODIUM SERPL-SCNC: 135 MMOL/L (ref 136–145)
TSH SERPL DL<=0.05 MIU/L-ACNC: 1.19 UIU/ML (ref 0.27–4.2)
WBC NRBC COR # BLD AUTO: 6.59 10*3/MM3 (ref 3.4–10.8)

## 2025-06-25 PROCEDURE — 36415 COLL VENOUS BLD VENIPUNCTURE: CPT

## 2025-06-25 PROCEDURE — 84443 ASSAY THYROID STIM HORMONE: CPT

## 2025-06-25 PROCEDURE — 80053 COMPREHEN METABOLIC PANEL: CPT

## 2025-06-25 PROCEDURE — 71046 X-RAY EXAM CHEST 2 VIEWS: CPT

## 2025-06-25 PROCEDURE — 83880 ASSAY OF NATRIURETIC PEPTIDE: CPT

## 2025-06-25 PROCEDURE — 85025 COMPLETE CBC W/AUTO DIFF WBC: CPT

## 2025-06-25 NOTE — TELEPHONE ENCOUNTER
"Called pt to follow up on reported symptoms. Pt states that on Monday he was doing physical work in the heat and began to have palpitations and could tell his heart rate was high. He checked with his monitor and it told him he was in Afib. He then took his Flecainide and his heart rate decreased to 110 bpm. States blood pressure has been normal and since taking the Flecainide once, is HR has stayed in the 70s and monitor shows sinus rhythm. However, he reports feeling exhausted, \"completely wiped out\". States he has been resting due to the fatigue. Pt states he mowed his yard yesterday and slept good last night with his pap machine and woke up feeling exhausted. Denies shortness of breath, palpitations and chest pain. Reviewed medication list, everything is current. Pt also states that he has been drinking close to 2 gallons of water a day. Please advise.   "

## 2025-06-25 NOTE — TELEPHONE ENCOUNTER
Spoke to  Emmanuel. He is going to [place] for labs and CXR today or in the morning. We also scheduled a follow up for 7/10 iat [place].He is aware that he may cancel that if results are normal and symptoms improve.

## 2025-06-25 NOTE — TELEPHONE ENCOUNTER
Sometimes can get fluid overload from a fib episode. Lets get labs and CXR.     OK to add on to [facility] schedule or [place] schedule the week I get back. Or can add on to NP. Please go ahead and schedule OV and we can cancel if he gets feeling better and does not need.     Thanks. Orders placed. [facility] would be easiest for us.

## 2025-06-25 NOTE — TELEPHONE ENCOUNTER
"    Caller: Dave Benitez \"Florencio\"       Best call back number: [phone]    What is your medical concern? PATIENT HAD AFIB ON MONDAY AND HAS FELT BAD EVER SINCE - EXTREME FATIGUE, LIGHTHEADED, ETC. TOOK FLECANIDE ON MONDAY AND BLOOD PRESSURE HAS BEEN OK SINCE, BUT HE WOULD LIKE A CALL TO ADVISE FURTHER AS HE IS NOT FEELING ANY BETTER    How long has this issue been going on? SINCE MONDAY    Is your provider already aware of this issue? SPOKE WITH SOMEONE YESTERDAY AND WAS TOLD TO CALL AGAIN IF HE IS NOT FEELING BETTER    "

## 2025-06-27 ENCOUNTER — TELEPHONE (OUTPATIENT)
Dept: CARDIOLOGY | Facility: CLINIC | Age: 65
End: 2025-06-27
Payer: COMMERCIAL

## 2025-06-27 NOTE — TELEPHONE ENCOUNTER
Patient called wanting advise about Metoprolol and direct sunlight. He states that for the last week he has noticed that when he goes out into direct sunlight, he becomes dizzy, hot and gets a headache, He states that if he goes into the shade or indoors the symptoms go away. He also reports that he has noticed he does not sweat. Pt states that he drinks water all the time, only drinks maybe 1  diet soda a week. He takes his Metoprolol in the evening and states that these symptoms seem to be worse in the am and while they are still there in the afternoon they are not as bad. 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 His questions are:  1.) Could this be related to Metoprolol 100 mg?  2.) Is not sweating a concern?  3.) how much water should he be drinking daily?  4.)  If possibly related to Metoprolol, could he be prescribed something different for his Afib?

## 2025-06-30 ENCOUNTER — OFFICE VISIT (OUTPATIENT)
Dept: FAMILY MEDICINE CLINIC | Facility: CLINIC | Age: 65
End: 2025-06-30
Payer: COMMERCIAL

## 2025-06-30 VITALS
OXYGEN SATURATION: 98 % | HEART RATE: 71 BPM | BODY MASS INDEX: 34.72 KG/M2 | SYSTOLIC BLOOD PRESSURE: 116 MMHG | DIASTOLIC BLOOD PRESSURE: 82 MMHG | WEIGHT: 262 LBS | HEIGHT: 73 IN

## 2025-06-30 DIAGNOSIS — R68.89 HEAT INTOLERANCE: ICD-10-CM

## 2025-06-30 DIAGNOSIS — I48.91 ATRIAL FIBRILLATION AND FLUTTER: ICD-10-CM

## 2025-06-30 DIAGNOSIS — L74.4 HYPOHIDROSIS: Primary | ICD-10-CM

## 2025-06-30 DIAGNOSIS — I10 PRIMARY HYPERTENSION: ICD-10-CM

## 2025-06-30 DIAGNOSIS — I48.92 ATRIAL FIBRILLATION AND FLUTTER: ICD-10-CM

## 2025-06-30 PROCEDURE — 99214 OFFICE O/P EST MOD 30 MIN: CPT | Performed by: INTERNAL MEDICINE

## 2025-06-30 NOTE — PROGRESS NOTES
Dave Benitez  1960  [id number]  Patient Care Team:  Vega Ash MD as PCP - General (Internal Medicine)  Shen Recio DO as Consulting Physician (Cardiology)  Bethanie Trujillo MD as Consulting Physician (Cardiology)  Andie Thrasher APRN as Nurse Practitioner (Nurse Practitioner)  Kasia Gomez APRN as Nurse Practitioner (Cardiology)    Dave Benitez is a 65 y.o. male here today for follow up.     This patient is accompanied by their self who contributes to the history of their care.    Chief Complaint:    Chief Complaint   Patient presents with    sweat issue     Not sweating at all    Dizziness     When he get hot        History of Present Illness:  I have reviewed and/or updated the patient's past medical, past surgical, family, social history, problem list and allergies as appropriate.     Florencio comes in with 1 week history of severe heat tolerance.  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  He reports the ability to sweat as recently as last week however lately with working in the sun, short time sun exposure he develops inability to sweat.  He is previously drank about 2 gallons of water.  This cardiology ordered some labs that showed minimally decreased sodium level.  Thyroid was unremarkable.  Continue related to his water consumption with resolution of his leg cramps.  Nausea no vomiting no diarrhea.  He had A-fib last week the response to flecainide.  Of note he is on relatively high-dose metoprolol, hydrochlorothiazide as well.  He is able to function at work normally in the cooler hours in the dark.      Review of Systems   Constitutional:  Positive for fatigue.   Cardiovascular:  Positive for palpitations. Negative for chest pain.   Gastrointestinal: Negative.    Endocrine: Positive for heat intolerance. Negative for polydipsia and polyuria.   Skin:         Inability to sweat   Neurological:  Positive for dizziness.  "Negative for syncope.       Vitals:    06/30/25 1153   BP: 116/82   Pulse: 71   SpO2: 98%   Weight: 119 kg (262 lb)   Height: 185.4 cm (72.99\")     Body mass index is 34.57 kg/m².    Physical Exam    Procedures    Results Review:    I reviewed the patient's new clinical results.  (hover) 5 d ago  (6/25/25) 10 mo ago  (8/19/24) 1 yr ago  (5/9/24) 1 yr ago  (3/26/24)   TSH 1.190 1.240 1.170 1.260   Resulting Agency  [facility]      onent  Ref Range & Units (hover) 5 d ago  (6/25/25)   Glucose 102 High    BUN 10.0   Creatinine 0.99   Sodium 135 Low    Potassium 3.8   Chloride 98   CO2 26.0   Calcium 9.7   Total Protein 7.7   Albumin 4.2   ALT (SGPT) 15   AST (SGOT) 22   Alkaline Phosphatase 98   Total Bilirubin 0.8   Globulin 3.5   A/G Ratio 1.2   BUN/Creatinine Ratio 10.1   Anion Gap 11.0   eGFR 85.1   Resulting [facility]            Component  Ref Range & Units (hover) 5 d ago  (6/25/25)   WBC 6.59   RBC 4.37   Hemoglobin 14.4   Hematocrit 41.6   MCV 95.2   MCH 33.0   MCHC 34.6   RDW 11.7 Low    RDW-SD 40.4   MPV 9.2             Assessment/Plan:    Problem List Items Addressed This Visit       Hypertensive disorder    Relevant Medications    losartan (COZAAR) 100 MG tablet    metoprolol succinate XL (TOPROL-XL) 100 MG 24 hr tablet    hydroCHLOROthiazide 25 MG tablet    Atrial fibrillation and flutter    Overview   PVI, 11/17/2022: Normal AV node and His-Purkinje system function. Durable isolation of left-sided pulmonary veins posterior wall as well as right inferior pulmonary from index ablation procedures in [place].  Reisolation of right superior pulmonary vein reisolation of left atrial roof reisolation of left atrial posterior wall. Catheter ablation of SVT #1 from proximal coronary sinus. Catheter ablation of SVT #2 from proximal coronary sinus         Relevant Medications    tadalafil (Cialis) 5 MG tablet    metoprolol succinate XL (TOPROL-XL) 100 MG 24 hr tablet    apixaban (ELIQUIS) 5 MG " tablet tablet    Hypohidrosis - Primary    Heat intolerance     Suspect this hyperhidrosis secondary to acclamation to heat and humidity with blunted response position mechanism secondary to beta-blocker and vasodilatation with hydrochlorothiazide.  Decreasing adagloxad has been 12.5 mg, has appointment with cardiology next week.  2 gallons of water recommended 1 and half gallons, electrolytes.    Plan of care reviewed with patient at the conclusion of today's visit. Education was provided regarding diagnosis and management.  Patient verbalizes understanding of and agreement with management plan.    Return if symptoms worsen or fail to improve.    Vega Ash MD      Please note than portions of this note were completed [facility] a Voice Recognition Program

## 2025-07-10 ENCOUNTER — OFFICE VISIT (OUTPATIENT)
Age: 65
End: 2025-07-10
Payer: COMMERCIAL

## 2025-07-10 VITALS
SYSTOLIC BLOOD PRESSURE: 134 MMHG | HEART RATE: 70 BPM | DIASTOLIC BLOOD PRESSURE: 82 MMHG | BODY MASS INDEX: 37.53 KG/M2 | WEIGHT: 283.2 LBS | HEIGHT: 73 IN

## 2025-07-10 DIAGNOSIS — R06.02 SHORTNESS OF BREATH: ICD-10-CM

## 2025-07-10 DIAGNOSIS — I48.0 AF (PAROXYSMAL ATRIAL FIBRILLATION): Primary | ICD-10-CM

## 2025-07-10 DIAGNOSIS — I10 ESSENTIAL HYPERTENSION: ICD-10-CM

## 2025-07-10 PROCEDURE — 99214 OFFICE O/P EST MOD 30 MIN: CPT | Performed by: INTERNAL MEDICINE

## 2025-07-10 RX ORDER — METOPROLOL SUCCINATE 50 MG/1
50 TABLET, EXTENDED RELEASE ORAL DAILY
Qty: 90 TABLET | Refills: 2 | Status: SHIPPED | OUTPATIENT
Start: 2025-07-10

## 2025-07-10 NOTE — PROGRESS NOTES
"    Cardiovascular and Sleep Consulting Provider Note     Date:   2025   Name: Dave Benitez  :   1960  PCP: Vega Ash MD    Chief Complaint   Patient presents with    Fatigue     Pt states that he is \"out of gas\". States he is very tired all of the time and thinks his medication is effecting his heart rate. Pt states that he decreased the amount of water he was drinking and is feeling some better. Also reports that he has started using salt on his food and that seems to be helping as well.     Atrial Fibrillation     Pt states that his monitor is telling him that he is in and out of Afib. PCP change HCTZ 25mg to HCTZ 12.5 mg daily.     Sleep Apnea     Reports using pap machine and has good reports from that but is still very tired through out the day. He states that he feels like he has more enrgy in the evening.        Subjective     History of Present Illness  Dave Benitez is a 65 y.o. male with PAF, ANGELITO who presents today for follow up.   He is having lot of fatigue.   EGD went well  Dr. Ash change HCTZ to 12.5 because of low sodium and potassium  Wearing pap machine well  No futther a fib besides the one.         1. PAF - Redo EP study 2022 with Redo PVI, RFA of  left atrial roof, left atrial posterior wall, 2 separate SVTs atrial tachycardias near coronary sinus. s.p PVI re do with Dr. Recio    2. ANGELITO 23 baseline AHI 18  Current settings 8-18 cm    3. HTN    4. LHC  [facility]    5. Syncope episode 2024    Cardiac MRI 24 normal size left ventricular with minimally depressed global systolic function 49% no late gadolinium no luteum enhancement of the LV myocardium.  Normal size right ventricle with normal global systolic function RVEF 45%.  Pulmonary regurgitation with a small visualized jet.  Regurgitant fraction is estimated to be 20% based on functional analysis, however flow analysis was not performed.    24 RHC ·  Normal cardiac output  ·  " "Mildly elevated PA pressures and PCWP.  ·  No O2 saturation \"step up\".    9/5/24 carotid duplex Right ICA: Imaging indicates normal flow without evidence of hemodynamically significant stenosis.   · Right Vertebral: Antegrade flow is present.   · Right Subclavian: Imaging indicates patent flow.   · Left ICA: Imaging indicates <50% stenosis.   · Left Vertebral: Antegrade flow is present.   · Left Subclavian: Imaging indicates patent flow.     Echo 9/5/2024  Left ventricular systolic function is normal. Left ventricular ejection fraction appears to be 61 - 65%.  ·  Left ventricular wall thickness is consistent with borderline septal asymmetric hypertrophy.  ·  Left ventricular diastolic function is consistent with (grade I) impaired relaxation.  ·  Estimated right ventricular systolic pressure from tricuspid regurgitation is normal (<35 mmHg).  ·  Mild dilation of the aortic root is present. Mild dilation of the sinuses of Valsalva is present. Mild dilation of the ascending aorta is present.  ·  Small left atrium with prominent coronary sinus.    Holter monitor 8/22/2024  A normal monitor study.  Some nonsustained arrhythmias. No pauses and no significant bradycardia. No reason for syncope seen.      Allergies   Allergen Reactions    Lisinopril Cough       Current Outpatient Medications:     apixaban (ELIQUIS) 5 MG tablet tablet, Take 1 tablet by mouth 2 (Two) Times a Day., Disp: 60 tablet, Rfl: 11    flecainide (TAMBOCOR) 100 MG tablet, TAKE 1 TABLET BY MOUTH TWICE DAILY AS NEEDED IF HEART RATE CONTINUES TO BE ELAVATED  OR HIGHER 1 HOUR AFTER METOPROLOL, Disp: 60 tablet, Rfl: 6    hydroCHLOROthiazide 25 MG tablet, Take 1 tablet by mouth once daily (Patient taking differently: Take 0.5 tablets by mouth Daily.), Disp: 90 tablet, Rfl: 0    losartan (COZAAR) 100 MG tablet, Take 1 tablet by mouth once daily, Disp: 90 tablet, Rfl: 1    metoprolol succinate XL (TOPROL-XL) 50 MG 24 hr tablet, Take 1 tablet by mouth " Daily., Disp: 90 tablet, Rfl: 2    omeprazole (priLOSEC) 20 MG capsule, Take 1 capsule by mouth Daily., Disp: 90 capsule, Rfl: 2    tadalafil (Cialis) 5 MG tablet, Take 1 tablet by mouth Daily. (Patient taking differently: Take 1 tablet by mouth Daily As Needed.), Disp: 30 tablet, Rfl: 3    Past Medical History:   Diagnosis Date    A-fib     Coe's esophagus     Colon cancer     12 years ago    Colon polyp     GERD (gastroesophageal reflux disease)     Hypertension     Sleep apnea     baseline AHI 18      Past Surgical History:   Procedure Laterality Date    ABLATION OF DYSRHYTHMIC FOCUS N/A 11/07/2014    x3    AXILLARY LYMPH NODE BIOPSY/EXCISION      CARDIAC CATHETERIZATION N/A 09/12/2024    Procedure: Right Heart Cath;  Surgeon: Jakub Rangel MD;  Location:  [facility] CATH [facility]ASIVE [facility];  Service: Cardiovascular;  Laterality: N/A;    CARDIAC ELECTROPHYSIOLOGY PROCEDURE N/A 11/17/2022    Procedure: Ablation atrial fibrillation +1 hour. Hold flecainide 3 days prior.;  Surgeon: Shen Recio DO;  Location:  [facility] EP INVASIVE LOCATION;  Service: Cardiovascular;  Laterality: N/A;    COLON RESECTION N/A     COLONOSCOPY      ENDOSCOPY      ENDOSCOPY N/A 4/30/2025    Procedure: ESOPHAGOGASTRODUODENOSCOPY;  Surgeon: Denny Hyatt MD;  Location:  [facility] ENDOSCOPY;  Service: Gastroenterology;  Laterality: N/A;    HERNIA REPAIR N/A     MENISCECTOMY Left      Family History   Problem Relation Age of Onset    Stroke Mother     Esophageal cancer Father      Social History     Socioeconomic History    Marital status:    Tobacco Use    Smoking status: Never     Passive exposure: Never    Smokeless tobacco: Former     Types: Chew     Quit date: 1982   Vaping Use    Vaping status: Never Used   Substance and Sexual Activity    Alcohol use: Yes     Comment: monthly    Drug use: Never    Sexual activity: Defer       Objective     Vital Signs:  /82 (BP Location: Right arm, Patient Position: Sitting, Cuff  "Size: Adult)   Pulse 70   Ht 185.4 cm (73\")   Wt 128 kg (283 lb 3.2 oz)   BMI 37.36 kg/m²   Estimated body mass index is 37.36 kg/m² as calculated from the following:    Height as of this encounter: 185.4 cm (73\").    Weight as of this encounter: 128 kg (283 lb 3.2 oz).         Physical Exam  Constitutional:       Appearance: Normal appearance. He is well-developed.   HENT:      Head: Normocephalic and atraumatic.   Eyes:      General: No scleral icterus.     Pupils: Pupils are equal, round, and reactive to light.   Cardiovascular:      Rate and Rhythm: Normal rate and regular rhythm.      Heart sounds: Normal heart sounds. No murmur heard.  Pulmonary:      Breath sounds: Normal breath sounds. No wheezing or rhonchi.   Musculoskeletal:      Right lower leg: No edema.      Left lower leg: No edema.   Skin:     Capillary Refill: Capillary refill takes less than 2 seconds.      Coloration: Skin is not cyanotic.      Nails: There is no clubbing.   Neurological:      Mental Status: He is alert and oriented to person, place, and time.      Motor: No weakness.      Gait: Gait normal.   Psychiatric:         Mood and Affect: Mood normal.         Behavior: Behavior is cooperative.         Thought Content: Thought content normal.                     Assessment and Plan     ASSESSMENTS AND ORDERS  Diagnoses and all orders for this visit:    1. AF (paroxysmal atrial fibrillation) (Primary)  -     metoprolol succinate XL (TOPROL-XL) 50 MG 24 hr tablet; Take 1 tablet by mouth Daily.  Dispense: 90 tablet; Refill: 2    2. Shortness of breath  -     metoprolol succinate XL (TOPROL-XL) 50 MG 24 hr tablet; Take 1 tablet by mouth Daily.  Dispense: 90 tablet; Refill: 2    3. Essential hypertension               PLAN  -Htn well controlled, continue HCTZ at current dose.   -CPAP doing well, good compliance and control  -decrease metoprolol for fatigue. Was doing better with 50mg.   -flecainide only as needed and only had one    -will " look into getting trace minerals and heavy metal testing        Follow Up  Return in about 3 months (around 10/10/2025) for Next scheduled follow up.    LORRAINE Trujillo MD  Cardiology and [facility]  7/10/25    Please note that this explicitly excludes time spent on other separate billable services such as performing procedures or test interpretation, when applicable.    This note was created using dictation software which occasionally transcribes nonsensical phrases. Please contact the provider if any clarification is needed.

## 2025-07-11 DIAGNOSIS — I48.0 AF (PAROXYSMAL ATRIAL FIBRILLATION): ICD-10-CM

## 2025-07-14 RX ORDER — HYDROCHLOROTHIAZIDE 12.5 MG/1
12.5 TABLET ORAL DAILY
COMMUNITY
End: 2025-07-14 | Stop reason: SDUPTHER

## 2025-07-14 RX ORDER — HYDROCHLOROTHIAZIDE 12.5 MG/1
12.5 TABLET ORAL DAILY
Qty: 90 TABLET | Refills: 1 | Status: SHIPPED | OUTPATIENT
Start: 2025-07-14

## 2025-07-14 NOTE — TELEPHONE ENCOUNTER
Patient called and LVM stating that he should have gotten a new prescription for his hydroCHLOROthiazide for 12.5 mg dose.  He understood that he was getting the new prescription for metoprolol (which he did and got from pharmacy) but also for the hydroCHLOROthiazide.  He is asking that it be sent to [facility] pharmacy.

## 2025-07-30 ENCOUNTER — TELEPHONE (OUTPATIENT)
Dept: CARDIOLOGY | Facility: CLINIC | Age: 65
End: 2025-07-30
Payer: COMMERCIAL

## 2025-07-30 NOTE — TELEPHONE ENCOUNTER
.Any new symptoms since last OV such as chest pain SOA? No  Any worsening of edema or worsening palpitations? No  Any major medical issues since last OV we need to know? No  Is the patient on Eliquis, xarelto, pradaxa? Yes, Eliquis 5 mg bid  Is the patient on aspirin? No  Is the patient on brilinta (ticagrelor), plavix (clopidogrel), prasugrel (effient)? No  Is the patient on medications like mounjaro or ozempic? No   Last EKG? 7-10-25  Last stress test? 9-26-22  Last echo? 9-5-24  Last heart cath or CCTA? 9-12-24  Last OV? 7-10-25 with Dr. Trujillo  Do you have a history of ANGELITO? Yes he is using a CPAP machine.

## 2025-07-31 ENCOUNTER — TELEPHONE (OUTPATIENT)
Age: 65
End: 2025-07-31
Payer: COMMERCIAL

## 2025-07-31 ENCOUNTER — OFFICE VISIT (OUTPATIENT)
Dept: FAMILY MEDICINE CLINIC | Facility: CLINIC | Age: 65
End: 2025-07-31
Payer: COMMERCIAL

## 2025-07-31 ENCOUNTER — LAB (OUTPATIENT)
Dept: LAB | Facility: HOSPITAL | Age: 65
End: 2025-07-31
Payer: COMMERCIAL

## 2025-07-31 VITALS
DIASTOLIC BLOOD PRESSURE: 86 MMHG | BODY MASS INDEX: 35.17 KG/M2 | HEIGHT: 73 IN | SYSTOLIC BLOOD PRESSURE: 132 MMHG | OXYGEN SATURATION: 97 % | WEIGHT: 265.4 LBS | HEART RATE: 66 BPM

## 2025-07-31 DIAGNOSIS — Z13.21 ENCOUNTER FOR VITAMIN DEFICIENCY SCREENING: ICD-10-CM

## 2025-07-31 DIAGNOSIS — I10 PRIMARY HYPERTENSION: ICD-10-CM

## 2025-07-31 DIAGNOSIS — R53.83 OTHER FATIGUE: ICD-10-CM

## 2025-07-31 DIAGNOSIS — R55 NEAR SYNCOPE: ICD-10-CM

## 2025-07-31 DIAGNOSIS — I48.91 ATRIAL FIBRILLATION AND FLUTTER: ICD-10-CM

## 2025-07-31 DIAGNOSIS — G47.33 OSA (OBSTRUCTIVE SLEEP APNEA): Primary | ICD-10-CM

## 2025-07-31 DIAGNOSIS — R53.83 OTHER FATIGUE: Primary | ICD-10-CM

## 2025-07-31 DIAGNOSIS — I48.92 ATRIAL FIBRILLATION AND FLUTTER: ICD-10-CM

## 2025-07-31 DIAGNOSIS — R68.89 HEAT INTOLERANCE: ICD-10-CM

## 2025-07-31 PROCEDURE — 83655 ASSAY OF LEAD: CPT

## 2025-07-31 PROCEDURE — 82175 ASSAY OF ARSENIC: CPT

## 2025-07-31 PROCEDURE — 83825 ASSAY OF MERCURY: CPT

## 2025-07-31 PROCEDURE — 99214 OFFICE O/P EST MOD 30 MIN: CPT | Performed by: INTERNAL MEDICINE

## 2025-07-31 PROCEDURE — 84255 ASSAY OF SELENIUM: CPT

## 2025-07-31 PROCEDURE — 86618 LYME DISEASE ANTIBODY: CPT

## 2025-07-31 PROCEDURE — 36415 COLL VENOUS BLD VENIPUNCTURE: CPT

## 2025-07-31 PROCEDURE — 87798 DETECT AGENT NOS DNA AMP: CPT

## 2025-07-31 PROCEDURE — 87468 ANAPLSMA PHGCYTOPHLM AMP PRB: CPT

## 2025-07-31 PROCEDURE — 87484 EHRLICHA CHAFFEENSIS AMP PRB: CPT

## 2025-07-31 RX ORDER — DILTIAZEM HYDROCHLORIDE 180 MG/1
180 CAPSULE, COATED, EXTENDED RELEASE ORAL DAILY
Qty: 90 CAPSULE | Refills: 2 | Status: SHIPPED | OUTPATIENT
Start: 2025-07-31

## 2025-07-31 NOTE — PROGRESS NOTES
..    [facility] CARDIOLOGY [address]  [facility] [address]  Phone: [phone]  Fax: [phone]    Cardiac Risk Assessment Review    Date: 2025    Patient Name: Dave Benitez  Patient :   1960    Surgical Procedure: Epidural Steroid Injection at [facility]     Procedure/Test Performed    [x] EKG    [x] Echocardiogram    [x] Stress Test    [x] Heart Cath/CCTA      Based on the above test results and/or clinical evaluation, it is my recommendation (per Dr. GWEN Trujillo):    [x]  Patient has an acceptable cardiac risk for the procedure as planned. Limitations of risk assessments have been reviewed with the patient.    [x] Patient can NOT stop Eliquis 2 days prior to the procedure.      [x] The patient has obstructive sleep apnea and/or central sleep apnea, and appropriate anesthesia precautions should be taken.     [x] Beta blockers should not be held in the perioperative period.      If you have any questions, please call our office at [phone].    Thank you,    LANE Saldana   [facility] [place]

## 2025-07-31 NOTE — PROGRESS NOTES
"Dave Benitez  1960  [id number]  Patient Care Team:  Vega Ash MD as PCP - General (Internal Medicine)  Shen Recio DO as Consulting Physician (Cardiology)  Bethanie Trujillo MD as Consulting Physician (Cardiology)  Andie Thrasher APRN as Nurse Practitioner (Nurse Practitioner)  Kasia Gomez APRN as Nurse Practitioner (Cardiology)    Dave Benitez is a 65 y.o. male here today for follow up.     This patient is accompanied by their self who contributes to the history of their care.    Chief Complaint:    Chief Complaint   Patient presents with    Fatigue     Thinks it is due to heart medication        History of Present Illness:  I have reviewed and/or updated the patient's past medical, past surgical, family, social history, problem list and allergies as appropriate.     He has been seen here as well as cardiology on multiple occasions for severe fatigue and exercise intolerance.  He does have obstructive sleep apnea, atrial fibrillation and slightly depressed LV function.  Medications have been adjusted however he continues to have difficulty with fatigue. In the cooler parts of day, or evening he can work without diffculty. He did resume adding salt to his food that that has helped his sweating. Reducing metoprolol in 1/2 has helped.     He still as severe difficulty  aruptly with heat especially when UV is high.  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  He is drinking \" too much\" water- a gallon per day.  There is  increased exercise intolerance. No orthopnea- no edema.    Very compliant with cpap.  His hctz has been decreased to 12.5 mg     BP stable at home    He relates this to his episode he experience last summer - syncope.     His appetite is ok. Denies headache or visual changes.   Severe " "heat intolerance.  He does not think he is in afib - uses cardiomobile. When he does have an episode- flecanide aborts it- however he has sever fatigue after this.     He denies any chest pain.  No nausea no vomiting no diarrhea no change in his bowels.      Review of Systems   Constitutional:  Positive for fatigue.   Respiratory:  Positive for apnea and shortness of breath.    Cardiovascular:  Negative for chest pain, palpitations and leg swelling.        Near syncope   Gastrointestinal: Negative.    Endocrine: Positive for heat intolerance.       Vitals:    07/31/25 0852   BP: 132/86   Pulse: 66   SpO2: 97%   Weight: 120 kg (265 lb 6.4 oz)   Height: 185.4 cm (72.99\")     Body mass index is 35.02 kg/m².    Physical Exam  Vitals reviewed.   Constitutional:       Appearance: He is well-developed.   HENT:      Head: Normocephalic and atraumatic.      Right Ear: Tympanic membrane normal.      Left Ear: Tympanic membrane normal.   Eyes:      General:         Right eye: No discharge.         Left eye: No discharge.      Conjunctiva/sclera: Conjunctivae normal.   Cardiovascular:      Rate and Rhythm: Normal rate and regular rhythm.   Pulmonary:      Effort: Pulmonary effort is normal.      Breath sounds: Normal breath sounds.   Chest:      Chest wall: No tenderness.   Abdominal:      General: Bowel sounds are normal.      Palpations: Abdomen is soft.   Genitourinary:     Comments: No CVA tendernesss   Skin:     General: Skin is warm and dry.   Neurological:      Mental Status: He is alert and oriented to person, place, and time.   Psychiatric:         Mood and Affect: Mood normal.         Behavior: Behavior normal.         Procedures    Results Review:    I reviewed the patient's new clinical results.  Hemodynamics  [facility] 9/12/24  RA pressures  (A/V/M) 10 mmHg    RV pressures (S/D/E) : 46/12 mmHg    PA pressures (S/D/M) : 44/20/30 mmHg    PCW pressures (A/V/M) 22 mmHg    RA O2 saturation: 76%  RV O2 saturation: 74%  PA O2 " saturation: 75%  PCW O2 saturation: 96%  Cardiac output (Roseanna method): 8.2 L/min  Cardiac index (Roseanna method): 3.3 L/min/m2  Cardiac output (thermodilution): 7.45 L/min  Cardiac index (thermodilution): 3 L/min/m2  SVC O2 sat 78  IVC O2 sat 71%   ECHO 8/22/2024  terpretation Summary         Left ventricular systolic function is normal. Left ventricular ejection fraction appears to be 61 - 65%.    Left ventricular wall thickness is consistent with borderline septal asymmetric hypertrophy.    Left ventricular diastolic function is consistent with (grade I) impaired relaxation.    Estimated right ventricular systolic pressure from tricuspid regurgitation is normal (<35 mmHg).    Mild dilation of the aortic root is present. Mild dilation of the sinuses of Valsalva is present. Mild dilation of the ascending aorta is present.    Small left atrium with prominent coronary sinus.  Cardiac Functional MRI   terpretation Summary         Left ventricular systolic function is normal. Left ventricular ejection fraction appears to be 61 - 65%.    Left ventricular wall thickness is consistent with borderline septal asymmetric hypertrophy.    Left ventricular diastolic function is consistent with (grade I) impaired relaxation.    Estimated right ventricular systolic pressure from tricuspid regurgitation is normal (<35 mmHg).    Mild dilation of the aortic root is present. Mild dilation of the sinuses of Valsalva is present. Mild dilation of the ascending aorta is present.    Small left atrium with prominent coronary sinus.    [facility] MRI 9/13/2024  terpretation Summary         Left ventricular systolic function is normal. Left ventricular ejection fraction appears to be 61 - 65%.    Left ventricular wall thickness is consistent with borderline septal asymmetric hypertrophy.    Left ventricular diastolic function is consistent with (grade I) impaired relaxation.    Estimated right ventricular systolic pressure from tricuspid  regurgitation is normal (<35 mmHg).    Mild dilation of the aortic root is present. Mild dilation of the sinuses of Valsalva is present. Mild dilation of the ascending aorta is present.    Small left atrium with prominent coronary sinus.  Assessment/Plan:    Problem List Items Addressed This Visit       Hypertensive disorder    Relevant Medications    tadalafil (Cialis) 5 MG tablet    losartan (COZAAR) 100 MG tablet    metoprolol succinate XL (TOPROL-XL) 50 MG 24 hr tablet    hydroCHLOROthiazide 12.5 MG tablet    dilTIAZem CD (Cardizem CD) 180 MG 24 hr capsule    ANGELITO (obstructive sleep apnea) - Primary    Atrial fibrillation and flutter    Overview   PVI, 11/17/2022: Normal AV node and His-Purkinje system function. Durable isolation of left-sided pulmonary veins posterior wall as well as right inferior pulmonary from index ablation procedures in [place].  Reisolation of right superior pulmonary vein reisolation of left atrial roof reisolation of left atrial posterior wall. Catheter ablation of SVT #1 from proximal coronary sinus. Catheter ablation of SVT #2 from proximal coronary sinus         Relevant Medications    tadalafil (Cialis) 5 MG tablet    flecainide (TAMBOCOR) 100 MG tablet    apixaban (ELIQUIS) 5 MG tablet tablet    metoprolol succinate XL (TOPROL-XL) 50 MG 24 hr tablet    dilTIAZem CD (Cardizem CD) 180 MG 24 hr capsule    Other Relevant Orders    Ambulatory Referral to [facility] [place] - [facility]    Heat intolerance    Relevant Orders    Ambulatory Referral to Endocrinology    Other fatigue    Relevant Orders    Ehrlichia Profile DNA PCR    Rickettsia Species DNA, Real-Time PCR    Lyme Disease Total Antibody With Reflex to Immunoassay    Ambulatory Referral to Endocrinology    Ambulatory Referral to [facility] [place] - [facility]    Selenium Serum    Selenium Whole Blood    Heavy Metals, Blood    Adult Transthoracic Echo Complete W/ Cont if Necessary Per Protocol     Other Visit  Diagnoses         Near syncope        Relevant Orders    Ambulatory Referral to [facility] [place] - [place]    Adult Transthoracic Echo Complete W/ Cont if Necessary Per Protocol        Concerned about trace minerals such as selenium and heavy metal toxicity work form.  I am concerned about possible bradycardia arrhythmia intolerance of metoprolol.  Will stop metoprolol try Cardizem.  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  Echocardiogram and MCOT has been requested as well.  Cardiology follow-up.    Plan of care reviewed with patient at the conclusion of today's visit. Education was provided regarding diagnosis and management.  Patient verbalizes understanding of and agreement with management plan.    Return if symptoms worsen or fail to improve, for Next scheduled follow up.    Vega Ash MD      Please note than portions of this note were completed wt a Voice Recognition Program

## 2025-08-01 LAB — B BURGDOR IGG+IGM SER QL IA: NEGATIVE

## 2025-08-02 ENCOUNTER — RESULTS FOLLOW-UP (OUTPATIENT)
Dept: FAMILY MEDICINE CLINIC | Facility: CLINIC | Age: 65
End: 2025-08-02
Payer: COMMERCIAL

## 2025-08-03 LAB
A PHAGOCYTOPH DNA BLD QL NAA+PROBE: NEGATIVE
EHRLICHIA DNA SPEC QL NAA+PROBE: NEGATIVE

## 2025-08-04 LAB — SELENIUM SERPL-MCNC: 118 UG/L (ref 93–198)

## 2025-08-05 LAB
RICKETTSIA RICKETTSII DNA, RT: NOT DETECTED
SELENIUM BLD-MCNC: 169 UG/L (ref 100–340)

## 2025-08-06 LAB
ARSENIC BLD-MCNC: 2 UG/L (ref 0–9)
LEAD BLDV-MCNC: 1.7 UG/DL (ref 0–3.4)
MERCURY BLD-MCNC: <1 UG/L (ref 0–14.9)

## 2025-08-07 ENCOUNTER — RESULTS FOLLOW-UP (OUTPATIENT)
Dept: FAMILY MEDICINE CLINIC | Facility: CLINIC | Age: 65
End: 2025-08-07
Payer: COMMERCIAL

## 2025-08-07 ENCOUNTER — OFFICE VISIT (OUTPATIENT)
Dept: CARDIOLOGY | Facility: HOSPITAL | Age: 65
End: 2025-08-07
Payer: COMMERCIAL

## 2025-08-07 ENCOUNTER — LAB (OUTPATIENT)
Dept: LAB | Facility: HOSPITAL | Age: 65
End: 2025-08-07
Payer: COMMERCIAL

## 2025-08-07 ENCOUNTER — HOSPITAL ENCOUNTER (OUTPATIENT)
Dept: CARDIOLOGY | Facility: HOSPITAL | Age: 65
Discharge: HOME | End: 2025-08-07
Payer: COMMERCIAL

## 2025-08-07 VITALS
HEART RATE: 70 BPM | RESPIRATION RATE: 18 BRPM | HEIGHT: 73 IN | SYSTOLIC BLOOD PRESSURE: 120 MMHG | DIASTOLIC BLOOD PRESSURE: 74 MMHG | OXYGEN SATURATION: 97 % | BODY MASS INDEX: 35.42 KG/M2 | WEIGHT: 267.25 LBS

## 2025-08-07 DIAGNOSIS — R73.03 PREDIABETES: ICD-10-CM

## 2025-08-07 DIAGNOSIS — Z13.21 ENCOUNTER FOR VITAMIN DEFICIENCY SCREENING: ICD-10-CM

## 2025-08-07 DIAGNOSIS — I10 ESSENTIAL HYPERTENSION: ICD-10-CM

## 2025-08-07 DIAGNOSIS — I10 PRIMARY HYPERTENSION: ICD-10-CM

## 2025-08-07 DIAGNOSIS — I48.0 AF (PAROXYSMAL ATRIAL FIBRILLATION): ICD-10-CM

## 2025-08-07 DIAGNOSIS — R68.89 DECREASED ACTIVITY TOLERANCE: ICD-10-CM

## 2025-08-07 DIAGNOSIS — R55 NEAR SYNCOPE: ICD-10-CM

## 2025-08-07 DIAGNOSIS — R53.83 OTHER FATIGUE: ICD-10-CM

## 2025-08-07 DIAGNOSIS — G47.33 OSA (OBSTRUCTIVE SLEEP APNEA): ICD-10-CM

## 2025-08-07 LAB
25(OH)D3 SERPL-MCNC: 36.6 NG/ML (ref 30–100)
ALBUMIN SERPL-MCNC: 4.2 G/DL (ref 3.5–5.2)
ALBUMIN/GLOB SERPL: 1.2 G/DL
ALP SERPL-CCNC: 91 U/L (ref 39–117)
ALT SERPL W P-5'-P-CCNC: 14 U/L (ref 1–41)
ANION GAP SERPL CALCULATED.3IONS-SCNC: 13 MMOL/L (ref 5–15)
AST SERPL-CCNC: 17 U/L (ref 1–40)
BILIRUB SERPL-MCNC: 0.4 MG/DL (ref 0–1.2)
BUN SERPL-MCNC: 16 MG/DL (ref 8–23)
BUN/CREAT SERPL: 16.8 (ref 7–25)
CALCIUM SPEC-SCNC: 9.4 MG/DL (ref 8.6–10.5)
CHLORIDE SERPL-SCNC: 102 MMOL/L (ref 98–107)
CO2 SERPL-SCNC: 23 MMOL/L (ref 22–29)
CREAT SERPL-MCNC: 0.95 MG/DL (ref 0.76–1.27)
EGFRCR SERPLBLD CKD-EPI 2021: 89.4 ML/MIN/1.73
GLOBULIN UR ELPH-MCNC: 3.5 GM/DL
GLUCOSE SERPL-MCNC: 105 MG/DL (ref 65–99)
HBA1C MFR BLD: 5.6 % (ref 4.8–5.6)
POTASSIUM SERPL-SCNC: 4 MMOL/L (ref 3.5–5.2)
PROT SERPL-MCNC: 7.7 G/DL (ref 6–8.5)
SODIUM SERPL-SCNC: 138 MMOL/L (ref 136–145)

## 2025-08-07 PROCEDURE — 93246 EXT ECG>7D<15D RECORDING: CPT

## 2025-08-07 PROCEDURE — 83036 HEMOGLOBIN GLYCOSYLATED A1C: CPT

## 2025-08-07 PROCEDURE — 80053 COMPREHEN METABOLIC PANEL: CPT

## 2025-08-07 PROCEDURE — 36415 COLL VENOUS BLD VENIPUNCTURE: CPT

## 2025-08-07 PROCEDURE — 82306 VITAMIN D 25 HYDROXY: CPT

## 2025-08-11 ENCOUNTER — TELEPHONE (OUTPATIENT)
Dept: CARDIOLOGY | Facility: CLINIC | Age: 65
End: 2025-08-11
Payer: COMMERCIAL

## 2025-08-20 ENCOUNTER — TELEPHONE (OUTPATIENT)
Dept: CARDIOLOGY | Facility: CLINIC | Age: 65
End: 2025-08-20
Payer: COMMERCIAL

## 2025-08-20 ENCOUNTER — TELEPHONE (OUTPATIENT)
Dept: CARDIOLOGY | Facility: HOSPITAL | Age: 65
End: 2025-08-20
Payer: COMMERCIAL

## 2025-08-20 LAB
CV ZIO BASELINE AVG BPM: 79 BPM
CV ZIO BASELINE BPM HIGH: 129 BPM
CV ZIO BASELINE BPM LOW: 34 BPM
CV ZIO DEVICE ANALYSIS TIME: NORMAL
CV ZIO ECT SVE COUNT: 421 EPISODES
CV ZIO ECT SVE CPLT COUNT: 9 EPISODES
CV ZIO ECT SVE CPLT FREQ: NORMAL
CV ZIO ECT SVE FREQ: NORMAL
CV ZIO ECT SVE TPLT COUNT: 3 EPISODES
CV ZIO ECT SVE TPLT FREQ: NORMAL
CV ZIO ECT VE COUNT: 584 EPISODES
CV ZIO ECT VE CPLT COUNT: 10 EPISODES
CV ZIO ECT VE CPLT FREQ: NORMAL
CV ZIO ECT VE FREQ: NORMAL
CV ZIO ECT VE TPLT COUNT: 0 EPISODES
CV ZIO ECT VE TPLT FREQ: 0
CV ZIO ECTOPIC SVE COUPLET RAW PERCENT: 0 %
CV ZIO ECTOPIC SVE ISOLATED PERCENT: 0.12 %
CV ZIO ECTOPIC SVE TRIPLET RAW PERCENT: 0 %
CV ZIO ECTOPIC VE COUPLET RAW PERCENT: 0.01 %
CV ZIO ECTOPIC VE ISOLATED PERCENT: 0.16 %
CV ZIO ECTOPIC VE TRIPLET RAW PERCENT: 0 %
CV ZIO ENROLLMENT END: NORMAL
CV ZIO ENROLLMENT START: NORMAL
CV ZIO L BIGEMINY DUR: 7.6 SEC
CV ZIO L BIGEMINY END: NORMAL
CV ZIO L BIGEMINY START: NORMAL
CV ZIO PATIENT EVENTS DIARIES: 3
CV ZIO PATIENT EVENTS TRIGGERS: 2
CV ZIO PAUSE COUNT: 0
CV ZIO PRESCRIPTION STATUS: NORMAL
CV ZIO SVT AVG BPM: 106 BPM
CV ZIO SVT BPM HIGH: 129 BPM
CV ZIO SVT BPM LOW: 80 BPM
CV ZIO SVT COUNT: 1
CV ZIO SVT F EPI AVG BPM: 106 BPM
CV ZIO SVT F EPI BEATS: 6 BEATS
CV ZIO SVT F EPI BPM HIGH: 129 BPM
CV ZIO SVT F EPI BPM LOW: 80 BPM
CV ZIO SVT F EPI DUR: 3.2 SEC
CV ZIO SVT F EPI END: NORMAL
CV ZIO SVT F EPI START: NORMAL
CV ZIO SVT L EPI AVG BPM: 106 BPM
CV ZIO SVT L EPI BEATS: 6 BEATS
CV ZIO SVT L EPI BPM HIGH: 129 BPM
CV ZIO SVT L EPI BPM LOW: 80 BPM
CV ZIO SVT L EPI DUR: 3.2 SEC
CV ZIO SVT L EPI END: NORMAL
CV ZIO SVT L EPI START: NORMAL
CV ZIO TOTAL  ENROLLMENT PERIOD: NORMAL
CV ZIO VT COUNT: 0

## (undated) DEVICE — SOL IRR H2O BO 1000ML STRL

## (undated) DEVICE — PINNACLE INTRODUCER SHEATH: Brand: PINNACLE

## (undated) DEVICE — SAFELINER SUCTION CANISTER 1000CC: Brand: DEROYAL

## (undated) DEVICE — HYBRID CO2 TUBING/CAP SET FOR OLYMPUS® SCOPES & CO2 SOURCE: Brand: ERBE

## (undated) DEVICE — INTRO ACCSR BLNT TP

## (undated) DEVICE — CONTN GRAD MEAS TRIANG 32OZ BLK

## (undated) DEVICE — AIR/WATER CLEANING VALVES: Brand: AIR/WATER CLEANING VALVES

## (undated) DEVICE — GW PERIPH VASC ADX J/TP SS .035 150CM 3MM

## (undated) DEVICE — KT ORCA ORCAPOD DISP STRL

## (undated) DEVICE — LUBE JELLY FOIL PACK 1.4 OZ: Brand: MEDLINE INDUSTRIES, INC.

## (undated) DEVICE — TUBING, SUCTION, 1/4" X 10', STRAIGHT: Brand: MEDLINE

## (undated) DEVICE — SOLIDIFIER LIQ PREMISORB 1500CC

## (undated) DEVICE — SPNG VERSALON 4X4 4PLY NONSTRL LF BG/200

## (undated) DEVICE — NDL PERC 1PRT THNWALL W/BASEPLT 18G 7CM

## (undated) DEVICE — FIRST STEP BEDSIDE ADD WATER KIT - RESEALABLE STAND-UP POUCH, ENDOSCOPIC CLEANING PAD - 1 POUCH: Brand: FIRST STEP BEDSIDE ADD WATER KIT - RESEALABLE STAND-UP POUCH, ENDOSCOPIC CLEANIN

## (undated) DEVICE — "MH-443 SUCTION VALVE F/EVIS140 EVIS160": Brand: SUCTION VALVE

## (undated) DEVICE — SWAN-GANZ THERMODILUTION CATHETER: Brand: SWAN-GANZ

## (undated) DEVICE — SYR LUERLOK 50ML

## (undated) DEVICE — ST LINER SAFECAP GRN RED CP STRL

## (undated) DEVICE — THE BITE BLOCK MAXI, LATEX FREE STRAP IS USED TO PROTECT THE ENDOSCOPE INSERTION TUBE FROM BEING BITTEN BY THE PATIENT.

## (undated) DEVICE — CO-SET DELIVERY SYSTEM FOR 123 ROOM TEMPATURE INJECTATE: Brand: CO-SET+

## (undated) DEVICE — SINGLE-USE BIOPSY FORCEPS: Brand: RADIAL JAW 4